# Patient Record
Sex: MALE | Race: WHITE | NOT HISPANIC OR LATINO | Employment: FULL TIME | URBAN - METROPOLITAN AREA
[De-identification: names, ages, dates, MRNs, and addresses within clinical notes are randomized per-mention and may not be internally consistent; named-entity substitution may affect disease eponyms.]

---

## 2023-02-28 LAB
ERYTHROCYTE DISTRIBUTION WIDTH (RATIO) BY AUTOMATED COUNT: 13.8 % (ref 11.5–14.5)
ERYTHROCYTE MEAN CORPUSCULAR HEMOGLOBIN CONCENTRATION (G/DL) BY AUTOMATED: 30.6 G/DL (ref 32–36)
ERYTHROCYTE MEAN CORPUSCULAR VOLUME (FL) BY AUTOMATED COUNT: 79 FL (ref 80–100)
ERYTHROCYTES (10*6/UL) IN BLOOD BY AUTOMATED COUNT: 5.41 X10E12/L (ref 4.5–5.9)
HEMATOCRIT (%) IN BLOOD BY AUTOMATED COUNT: 42.8 % (ref 41–52)
HEMOGLOBIN (G/DL) IN BLOOD: 13.1 G/DL (ref 13.5–17.5)
LEUKOCYTES (10*3/UL) IN BLOOD BY AUTOMATED COUNT: 8.6 X10E9/L (ref 4.4–11.3)
NRBC (PER 100 WBCS) BY AUTOMATED COUNT: 0 /100 WBC (ref 0–0)
PLATELETS (10*3/UL) IN BLOOD AUTOMATED COUNT: 371 X10E9/L (ref 150–450)
TESTOSTERONE (NG/DL) IN SER/PLAS: 396 NG/DL (ref 240–1000)

## 2023-03-06 DIAGNOSIS — F64.9 GENDER DYSPHORIA: Primary | ICD-10-CM

## 2023-03-06 RX ORDER — TESTOSTERONE CYPIONATE 200 MG/ML
120 INJECTION, SOLUTION INTRAMUSCULAR
Qty: 10 ML | Refills: 0 | Status: SHIPPED | OUTPATIENT
Start: 2023-03-06 | End: 2023-05-19 | Stop reason: SDUPTHER

## 2023-03-06 RX ORDER — TESTOSTERONE CYPIONATE 200 MG/ML
120 INJECTION, SOLUTION INTRAMUSCULAR
COMMUNITY
End: 2023-03-06 | Stop reason: SDUPTHER

## 2023-05-18 PROBLEM — M20.5X2: Status: ACTIVE | Noted: 2023-05-18

## 2023-05-18 PROBLEM — F33.8 SEASONAL AFFECTIVE DISORDER (CMS-HCC): Status: ACTIVE | Noted: 2023-05-18

## 2023-05-18 PROBLEM — E66.9 OBESITY (BMI 30-39.9): Status: ACTIVE | Noted: 2023-05-18

## 2023-05-18 PROBLEM — L98.7 EXCESSIVE SKIN AND SUBCUTANEOUS TISSUE: Status: ACTIVE | Noted: 2023-05-18

## 2023-05-18 PROBLEM — S99.242D: Status: RESOLVED | Noted: 2023-05-18 | Resolved: 2023-05-18

## 2023-05-18 PROBLEM — R14.0 ABDOMINAL BLOATING: Status: RESOLVED | Noted: 2023-05-18 | Resolved: 2023-05-18

## 2023-05-18 PROBLEM — S06.0XAA CONCUSSION: Status: RESOLVED | Noted: 2023-05-18 | Resolved: 2023-05-18

## 2023-05-18 PROBLEM — F41.9 ANXIETY: Status: ACTIVE | Noted: 2023-05-18

## 2023-05-18 PROBLEM — Z78.9 FEMALE-TO-MALE TRANSGENDER PERSON: Status: ACTIVE | Noted: 2023-05-18

## 2023-05-18 PROBLEM — L70.9 ACNE: Status: ACTIVE | Noted: 2023-05-18

## 2023-05-18 PROBLEM — F64.9 GENDER DYSPHORIA: Status: ACTIVE | Noted: 2023-05-18

## 2023-05-18 PROBLEM — F90.0 ATTENTION DEFICIT HYPERACTIVITY DISORDER (ADHD), PREDOMINANTLY INATTENTIVE TYPE: Status: ACTIVE | Noted: 2023-05-18

## 2023-05-18 PROBLEM — F32.5 MAJOR DEPRESSIVE DISORDER IN REMISSION (CMS-HCC): Status: ACTIVE | Noted: 2023-05-18

## 2023-05-18 PROBLEM — L40.9 PSORIASIS: Status: ACTIVE | Noted: 2023-05-18

## 2023-05-18 PROBLEM — K58.0 IRRITABLE BOWEL SYNDROME WITH DIARRHEA: Status: ACTIVE | Noted: 2023-05-18

## 2023-05-18 PROBLEM — D64.9 ANEMIA: Status: ACTIVE | Noted: 2023-05-18

## 2023-05-18 RX ORDER — CALCIUM CARBONATE 300MG(750)
400 TABLET,CHEWABLE ORAL DAILY
COMMUNITY
Start: 2023-04-28

## 2023-05-18 RX ORDER — LORATADINE PSEUDOEPHEDRINE SULFATE 10; 240 MG/1; MG/1
1 TABLET, EXTENDED RELEASE ORAL DAILY
COMMUNITY
Start: 2022-05-13 | End: 2023-12-21 | Stop reason: SDUPTHER

## 2023-05-18 RX ORDER — SYRINGE W-NEEDLE,DISPOSAB,3 ML 25GX5/8"
1 SYRINGE, EMPTY DISPOSABLE MISCELLANEOUS
COMMUNITY
Start: 2020-07-31

## 2023-05-18 RX ORDER — ESCITALOPRAM OXALATE 20 MG/1
1 TABLET ORAL DAILY
COMMUNITY
Start: 2022-05-18 | End: 2023-05-19 | Stop reason: ALTCHOICE

## 2023-05-18 RX ORDER — IBUPROFEN 600 MG/1
1 TABLET ORAL EVERY 4 HOURS PRN
COMMUNITY
End: 2023-10-27 | Stop reason: ALTCHOICE

## 2023-05-18 RX ORDER — LISDEXAMFETAMINE DIMESYLATE 50 MG/1
1 CAPSULE ORAL EVERY MORNING
COMMUNITY
Start: 2022-11-10

## 2023-05-18 RX ORDER — TACROLIMUS 1 MG/G
1 OINTMENT TOPICAL 2 TIMES DAILY
COMMUNITY
Start: 2022-09-16 | End: 2023-10-27 | Stop reason: ALTCHOICE

## 2023-05-18 RX ORDER — CLINDAMYCIN PHOSPHATE 10 MG/G
1 GEL TOPICAL 2 TIMES DAILY
COMMUNITY
Start: 2022-09-16 | End: 2024-04-16 | Stop reason: WASHOUT

## 2023-05-19 ENCOUNTER — OFFICE VISIT (OUTPATIENT)
Dept: PRIMARY CARE | Facility: CLINIC | Age: 28
End: 2023-05-19
Payer: COMMERCIAL

## 2023-05-19 VITALS
RESPIRATION RATE: 18 BRPM | TEMPERATURE: 95.1 F | OXYGEN SATURATION: 98 % | SYSTOLIC BLOOD PRESSURE: 122 MMHG | HEIGHT: 65 IN | HEART RATE: 90 BPM | BODY MASS INDEX: 31.75 KG/M2 | WEIGHT: 190.6 LBS | DIASTOLIC BLOOD PRESSURE: 84 MMHG

## 2023-05-19 DIAGNOSIS — Z11.3 SCREENING EXAMINATION FOR STD (SEXUALLY TRANSMITTED DISEASE): ICD-10-CM

## 2023-05-19 DIAGNOSIS — E66.9 OBESITY (BMI 30-39.9): ICD-10-CM

## 2023-05-19 DIAGNOSIS — F64.9 GENDER DYSPHORIA: Primary | ICD-10-CM

## 2023-05-19 PROCEDURE — 3008F BODY MASS INDEX DOCD: CPT | Performed by: FAMILY MEDICINE

## 2023-05-19 PROCEDURE — 99214 OFFICE O/P EST MOD 30 MIN: CPT | Performed by: FAMILY MEDICINE

## 2023-05-19 PROCEDURE — 1036F TOBACCO NON-USER: CPT | Performed by: FAMILY MEDICINE

## 2023-05-19 RX ORDER — TESTOSTERONE CYPIONATE 200 MG/ML
INJECTION, SOLUTION INTRAMUSCULAR
Qty: 10 ML | Refills: 2 | Status: SHIPPED | OUTPATIENT
Start: 2023-05-19 | End: 2023-05-25 | Stop reason: SDUPTHER

## 2023-05-19 ASSESSMENT — ENCOUNTER SYMPTOMS
JOINT SWELLING: 0
HALLUCINATIONS: 0
FATIGUE: 0
FEVER: 0
CONFUSION: 0
DYSURIA: 0
COUGH: 0
BACK PAIN: 0
DIARRHEA: 0
MYALGIAS: 0
POLYDIPSIA: 0
NUMBNESS: 0
VOMITING: 0
PALPITATIONS: 0
CHILLS: 0
HEADACHES: 0
SLEEP DISTURBANCE: 0
WEAKNESS: 0
HEMATURIA: 0
SHORTNESS OF BREATH: 0
SORE THROAT: 0
WHEEZING: 0
APPETITE CHANGE: 0
NAUSEA: 0
AGITATION: 0
EYE REDNESS: 0
FREQUENCY: 0
EYE PAIN: 0
ACTIVITY CHANGE: 0
CONSTIPATION: 0
WOUND: 0
ABDOMINAL PAIN: 0
DIFFICULTY URINATING: 0
DIZZINESS: 0

## 2023-05-19 ASSESSMENT — PAIN SCALES - GENERAL: PAINLEVEL: 0-NO PAIN

## 2023-05-19 NOTE — ASSESSMENT & PLAN NOTE
Energy improved on 0.7mg every 14 days, patient content, no significant side effects. Continues to follow with Yuval Chris for psychiatric needs. Rescheduled appointment with Dr. Vazquez, but still excited for this appointment. Work going well, stress improving.

## 2023-05-19 NOTE — ASSESSMENT & PLAN NOTE
10 pound intentional weight loss since 2/2023, attributes to being outside more with more physical activity, aims to get to around 170 pounds, encouraged continued efforts.

## 2023-05-19 NOTE — PROGRESS NOTES
Subjective   Patient ID: Sheng is a 27 y.o. FtM transgender man with PMH of anemia, psoriasis, obesity (BMI 31), anxiety, ADHD, depression who presents for Follow-up.    # Gender dysphoria  - injecting every other week, not noticing big swings, wife hasn't noticed anything either;  - usually picks up 6 vials for 12 weeks (last filled March 6th)  - upped dose in 11/2022, which helped to decrease downswing in symptoms, has felt that levels are stable  - energy ok, now on testosterone 0.7mg every other week  - no issues with needle phobia, injects himself  - next appointment with Yuval Chris is 7/24 (last saw him 4/28)  - rescheduled appointment with Dr. Vazquez  - lost 10 pounds since 2/2023; is summer time, outside more often, works as an  with fair amount of site visits  - no smoking, 1-2 EtOH drink per week, THC edibles (gets through [place])  - stress not too bad now, last month had a couple 50 hour weeks, work hours have been improving over the past two weeks      Review of Systems   Constitutional:  Negative for activity change, appetite change, chills, fatigue and fever.   HENT:  Negative for congestion, hearing loss and sore throat.    Eyes:  Negative for pain, redness and visual disturbance.   Respiratory:  Negative for cough, shortness of breath and wheezing.    Cardiovascular:  Negative for chest pain, palpitations and leg swelling.   Gastrointestinal:  Negative for abdominal pain, constipation, diarrhea, nausea and vomiting.   Endocrine: Negative for cold intolerance, heat intolerance, polydipsia and polyuria.   Genitourinary:  Negative for difficulty urinating, dysuria, frequency, hematuria and urgency.   Musculoskeletal:  Negative for back pain, gait problem, joint swelling and myalgias.   Skin:  Negative for rash and wound.   Allergic/Immunologic: Negative for immunocompromised state.   Neurological:  Negative for dizziness, syncope, weakness, numbness and headaches.   Psychiatric/Behavioral:   "Negative for agitation, behavioral problems, confusion, hallucinations and sleep disturbance.      Current Outpatient Medications   Medication Instructions    clindamycin (Clindagel) 1 % gel 1 Application, Topical, 2 times daily    diclofenac sodium 1 % kit 1 Application, Topical, 3 times daily PRN    ibuprofen 600 mg tablet 1 tablet, oral, Every 4 hours PRN    lisdexamfetamine (Vyvanse) 50 mg capsule 1 capsule, oral, Every morning    loratadine-pseudoephedrine (Claritin-D 24 Hour)  mg 24 hr tablet 1 tablet, oral, Daily    magnesium oxide (MAG-OX) 400 mg, oral, Daily    syringe with needle (Syringe 3cc/22Gx1\") 3 mL 22 gauge x 1\" syringe 1 each, intramuscular, Every 14 days    syringe with needle 12 mL 18 gauge x 1\" syringe 1 each, Does not apply, Every 14 days, Use 18G needle to draw up Testosterone, 22G needle to inject.    tacrolimus (Protopic) 0.1 % ointment 1 Application, Topical, 2 times daily    testosterone cypionate (Depo-Testosterone) 200 mg/mL injection Inject 0.7ml every 2 weeks       Objective   Vitals: /84 (BP Location: Left arm, Patient Position: Sitting, BP Cuff Size: Adult)   Pulse 90   Temp 35.1 °C (95.1 °F) (Temporal)   Resp 18   Ht 1.651 m (5' 5\")   Wt 86.5 kg (190 lb 9.6 oz)   SpO2 98%   BMI 31.72 kg/m²      Physical Exam  Vitals reviewed.   Constitutional:       General: He is not in acute distress.     Appearance: Normal appearance. He is not ill-appearing.   HENT:      Head: Normocephalic and atraumatic.   Eyes:      Extraocular Movements: Extraocular movements intact.      Conjunctiva/sclera: Conjunctivae normal.   Cardiovascular:      Rate and Rhythm: Normal rate and regular rhythm.      Pulses: Normal pulses.      Heart sounds: No murmur heard.     No friction rub. No gallop.   Pulmonary:      Effort: Pulmonary effort is normal. No respiratory distress.      Breath sounds: Normal breath sounds. No wheezing, rhonchi or rales.   Abdominal:      General: Abdomen is flat. " There is no distension.      Palpations: Abdomen is soft.      Tenderness: There is no abdominal tenderness. There is no guarding.   Musculoskeletal:         General: No tenderness or signs of injury. Normal range of motion.      Cervical back: Normal range of motion.      Right lower leg: No edema.      Left lower leg: No edema.   Skin:     General: Skin is warm and dry.   Neurological:      General: No focal deficit present.      Mental Status: He is alert and oriented to person, place, and time.      Cranial Nerves: No cranial nerve deficit.      Motor: No weakness.      Gait: Gait normal.   Psychiatric:         Mood and Affect: Mood normal.         Behavior: Behavior normal.       Assessment/Plan   Problem List Items Addressed This Visit       Gender dysphoria - Primary     Energy improved on 0.7mg every 14 days, patient content, no significant side effects. Continues to follow with Yuval Chris for psychiatric needs. Rescheduled appointment with Dr. Vazquez, but still excited for this appointment. Work going well, stress improving.         Relevant Medications    testosterone cypionate (Depo-Testosterone) 200 mg/mL injection    Other Relevant Orders    CBC    Testosterone    Obesity (BMI 30-39.9)     10 pound intentional weight loss since 2/2023, attributes to being outside more with more physical activity, aims to get to around 170 pounds, encouraged continued efforts.          Other Visit Diagnoses       Screening examination for STD (sexually transmitted disease)        routine STI screening ordered today, patient amenable    Relevant Orders    HIV 1/2 Antigen/Antibody Screen with Reflex to Confirmation    C. trachomatis / N. gonorrhoeae, DNA probe    Syphilis Screen with Reflex          Attending Supervision: Patient seen and discussed with attending physician (cosigner listed on this note).    RTC in 3-6mo, or earlier as needed.    Lazara Barragan MD  Family Medicine PGY2  [facility]

## 2023-05-24 ENCOUNTER — LAB (OUTPATIENT)
Dept: LAB | Facility: LAB | Age: 28
End: 2023-05-24
Payer: COMMERCIAL

## 2023-05-24 DIAGNOSIS — F64.9 GENDER DYSPHORIA: ICD-10-CM

## 2023-05-24 DIAGNOSIS — Z11.3 SCREENING EXAMINATION FOR STD (SEXUALLY TRANSMITTED DISEASE): ICD-10-CM

## 2023-05-24 LAB
ERYTHROCYTE DISTRIBUTION WIDTH (RATIO) BY AUTOMATED COUNT: 13.7 % (ref 11.5–14.5)
ERYTHROCYTE MEAN CORPUSCULAR HEMOGLOBIN CONCENTRATION (G/DL) BY AUTOMATED: 30 G/DL (ref 32–36)
ERYTHROCYTE MEAN CORPUSCULAR VOLUME (FL) BY AUTOMATED COUNT: 80 FL (ref 80–100)
ERYTHROCYTES (10*6/UL) IN BLOOD BY AUTOMATED COUNT: 5.56 X10E12/L (ref 4.5–5.9)
HEMATOCRIT (%) IN BLOOD BY AUTOMATED COUNT: 44.6 % (ref 41–52)
HEMOGLOBIN (G/DL) IN BLOOD: 13.4 G/DL (ref 13.5–17.5)
LEUKOCYTES (10*3/UL) IN BLOOD BY AUTOMATED COUNT: 9.2 X10E9/L (ref 4.4–11.3)
NRBC (PER 100 WBCS) BY AUTOMATED COUNT: 0 /100 WBC (ref 0–0)
PLATELETS (10*3/UL) IN BLOOD AUTOMATED COUNT: 375 X10E9/L (ref 150–450)

## 2023-05-24 PROCEDURE — 85027 COMPLETE CBC AUTOMATED: CPT

## 2023-05-24 PROCEDURE — 84403 ASSAY OF TOTAL TESTOSTERONE: CPT

## 2023-05-24 PROCEDURE — 87389 HIV-1 AG W/HIV-1&-2 AB AG IA: CPT

## 2023-05-24 PROCEDURE — 87491 CHLMYD TRACH DNA AMP PROBE: CPT

## 2023-05-24 PROCEDURE — 86780 TREPONEMA PALLIDUM: CPT

## 2023-05-24 PROCEDURE — 87591 N.GONORRHOEAE DNA AMP PROB: CPT

## 2023-05-24 PROCEDURE — 36415 COLL VENOUS BLD VENIPUNCTURE: CPT

## 2023-05-25 DIAGNOSIS — F64.9 GENDER DYSPHORIA: Primary | ICD-10-CM

## 2023-05-25 LAB
CHLAMYDIA TRACH., AMPLIFIED: NEGATIVE
HIV 1/ 2 AG/AB SCREEN: NONREACTIVE
N. GONORRHEA, AMPLIFIED: NEGATIVE
SYPHILIS TOTAL AB: NONREACTIVE
TESTOSTERONE (NG/DL) IN SER/PLAS: 67 NG/DL (ref 240–1000)

## 2023-05-25 NOTE — PROGRESS NOTES
I saw and evaluated the patient. I personally obtained the key and critical portions of the history and physical exam or was physically present for key and critical portions performed by the resident. I reviewed the resident's documentation and discussed the patient with the resident. I agree with the resident's medical decision making as documented in the note.    Melinda Graves MD

## 2023-05-31 ENCOUNTER — LAB (OUTPATIENT)
Dept: LAB | Facility: LAB | Age: 28
End: 2023-05-31
Payer: COMMERCIAL

## 2023-05-31 DIAGNOSIS — F64.9 GENDER DYSPHORIA: ICD-10-CM

## 2023-05-31 PROCEDURE — 36415 COLL VENOUS BLD VENIPUNCTURE: CPT

## 2023-05-31 PROCEDURE — 84403 ASSAY OF TOTAL TESTOSTERONE: CPT

## 2023-06-01 LAB — TESTOSTERONE (NG/DL) IN SER/PLAS: 440 NG/DL (ref 240–1000)

## 2023-06-08 DIAGNOSIS — F64.9 GENDER DYSPHORIA: ICD-10-CM

## 2023-06-08 RX ORDER — TESTOSTERONE CYPIONATE 200 MG/ML
INJECTION, SOLUTION INTRAMUSCULAR
Qty: 10 ML | Refills: 2 | Status: SHIPPED | OUTPATIENT
Start: 2023-06-08 | End: 2023-11-15 | Stop reason: SDUPTHER

## 2023-07-29 LAB
AMPHETAMINE (PRESENCE) IN URINE BY SCREEN METHOD: ABNORMAL
BARBITURATES PRESENCE IN URINE BY SCREEN METHOD: ABNORMAL
BENZODIAZEPINE (PRESENCE) IN URINE BY SCREEN METHOD: ABNORMAL
CANNABINOIDS IN URINE BY SCREEN METHOD: ABNORMAL
COCAINE (PRESENCE) IN URINE BY SCREEN METHOD: ABNORMAL
DRUG SCREEN COMMENT URINE: ABNORMAL
FENTANYL URINE: ABNORMAL
METHADONE (PRESENCE) IN URINE BY SCREEN METHOD: ABNORMAL
OPIATES (PRESENCE) IN URINE BY SCREEN METHOD: ABNORMAL
OXYCODONE (PRESENCE) IN URINE BY SCREEN METHOD: ABNORMAL
PHENCYCLIDINE (PRESENCE) IN URINE BY SCREEN METHOD: ABNORMAL

## 2023-08-07 LAB
AMPHETAMINES,URINE: 808 NG/ML
MDA,URINE: <200 NG/ML
MDEA,URINE: <200 NG/ML
MDMA,UR: <200 NG/ML
METHAMPHETAMINE QUANTITATIVE URINE: <200 NG/ML
PHENTERMINE,UR: <200 NG/ML

## 2023-08-17 LAB
ALANINE AMINOTRANSFERASE (SGPT) (U/L) IN SER/PLAS: 15 U/L (ref 10–52)
ALBUMIN (G/DL) IN SER/PLAS: 4.8 G/DL (ref 3.4–5)
ALKALINE PHOSPHATASE (U/L) IN SER/PLAS: 96 U/L (ref 33–120)
ANION GAP IN SER/PLAS: 14 MMOL/L (ref 10–20)
ASPARTATE AMINOTRANSFERASE (SGOT) (U/L) IN SER/PLAS: 12 U/L (ref 9–39)
BILIRUBIN TOTAL (MG/DL) IN SER/PLAS: 0.7 MG/DL (ref 0–1.2)
CALCIUM (MG/DL) IN SER/PLAS: 9.8 MG/DL (ref 8.6–10.6)
CARBON DIOXIDE, TOTAL (MMOL/L) IN SER/PLAS: 30 MMOL/L (ref 21–32)
CHLORIDE (MMOL/L) IN SER/PLAS: 102 MMOL/L (ref 98–107)
CREATININE (MG/DL) IN SER/PLAS: 1 MG/DL (ref 0.5–1.3)
ERYTHROCYTE DISTRIBUTION WIDTH (RATIO) BY AUTOMATED COUNT: 14.1 % (ref 11.5–14.5)
ERYTHROCYTE MEAN CORPUSCULAR HEMOGLOBIN CONCENTRATION (G/DL) BY AUTOMATED: 30.7 G/DL (ref 32–36)
ERYTHROCYTE MEAN CORPUSCULAR VOLUME (FL) BY AUTOMATED COUNT: 81 FL (ref 80–100)
ERYTHROCYTES (10*6/UL) IN BLOOD BY AUTOMATED COUNT: 5.57 X10E12/L (ref 4.5–5.9)
GFR MALE: >90 ML/MIN/1.73M2
GLUCOSE (MG/DL) IN SER/PLAS: 82 MG/DL (ref 74–99)
HEMATOCRIT (%) IN BLOOD BY AUTOMATED COUNT: 45 % (ref 41–52)
HEMOGLOBIN (G/DL) IN BLOOD: 13.8 G/DL (ref 13.5–17.5)
LEUKOCYTES (10*3/UL) IN BLOOD BY AUTOMATED COUNT: 9.1 X10E9/L (ref 4.4–11.3)
NRBC (PER 100 WBCS) BY AUTOMATED COUNT: 0 /100 WBC (ref 0–0)
PLATELETS (10*3/UL) IN BLOOD AUTOMATED COUNT: 336 X10E9/L (ref 150–450)
POTASSIUM (MMOL/L) IN SER/PLAS: 4.5 MMOL/L (ref 3.5–5.3)
PROTEIN TOTAL: 7.7 G/DL (ref 6.4–8.2)
SODIUM (MMOL/L) IN SER/PLAS: 141 MMOL/L (ref 136–145)
TESTOSTERONE (NG/DL) IN SER/PLAS: 415 NG/DL (ref 240–1000)
UREA NITROGEN (MG/DL) IN SER/PLAS: 17 MG/DL (ref 6–23)

## 2023-09-30 PROBLEM — R41.840 INATTENTION: Status: ACTIVE | Noted: 2023-09-30

## 2023-09-30 PROBLEM — D22.4 MELANOCYTIC NEVI OF SCALP AND NECK: Status: ACTIVE | Noted: 2021-04-06

## 2023-09-30 PROBLEM — L21.9 SEBORRHEIC DERMATITIS, UNSPECIFIED: Status: ACTIVE | Noted: 2021-04-06

## 2023-09-30 PROBLEM — F07.81 CONCUSSION SYNDROME: Status: ACTIVE | Noted: 2023-09-30

## 2023-09-30 PROBLEM — N92.0 HEAVY MENSES: Status: ACTIVE | Noted: 2023-09-30

## 2023-09-30 RX ORDER — ISOPROPYL ALCOHOL 70 ML/100ML
SWAB TOPICAL
COMMUNITY
Start: 2020-07-31

## 2023-09-30 RX ORDER — KETOCONAZOLE 20 MG/G
1 CREAM TOPICAL
COMMUNITY
Start: 2021-04-06 | End: 2023-10-27 | Stop reason: ALTCHOICE

## 2023-10-04 ENCOUNTER — TELEMEDICINE (OUTPATIENT)
Dept: PRIMARY CARE | Facility: CLINIC | Age: 28
End: 2023-10-04
Payer: COMMERCIAL

## 2023-10-04 DIAGNOSIS — F64.9 GENDER DYSPHORIA: Primary | ICD-10-CM

## 2023-10-04 DIAGNOSIS — Z20.2 POSSIBLE EXPOSURE TO STD: ICD-10-CM

## 2023-10-04 PROCEDURE — 99213 OFFICE O/P EST LOW 20 MIN: CPT | Mod: GT,95 | Performed by: FAMILY MEDICINE

## 2023-10-04 PROCEDURE — 99213 OFFICE O/P EST LOW 20 MIN: CPT | Performed by: FAMILY MEDICINE

## 2023-10-05 NOTE — PROGRESS NOTES
Subjective   Patient ID: Sheng Turner is a 27 y.o. who presents for follow-up  HPI    Gender care  - Tolerating testosterone every 2 weeks. Previously had highs/lows but now feels good with current dosing. Taking 0.8ml (160mg) every 2 weeks  - s/p mastectomy, hysterectomy, oophorectomy     Depression, ADHD  - Mood stable, following with psychiatry    No further concerns today      Review of Systems  10 point review of systems negative except as noted in HPI.    Objective     There were no vitals taken for this visit.  Gen: Well appearing, no acute distress  HEENT: Mucous membranes moist  Pulm: Respirations nonlabored  Psych: Normal mood and affect      Assessment/Plan   28 yo seen via virtual visit for follow-up    Gender dysphoria  - Continue current regimen  - Repeat safety labs as ordered    Health maintenance  - Requesting STD testing. 2 partners in last 3 months    ADHD, depression  - Continue current management    RTC 6 months or sooner as needed

## 2023-10-14 ENCOUNTER — LAB (OUTPATIENT)
Dept: LAB | Facility: LAB | Age: 28
End: 2023-10-14
Payer: COMMERCIAL

## 2023-10-14 DIAGNOSIS — Z20.2 POSSIBLE EXPOSURE TO STD: ICD-10-CM

## 2023-10-14 DIAGNOSIS — F64.9 GENDER DYSPHORIA: ICD-10-CM

## 2023-10-14 LAB
ERYTHROCYTE [DISTWIDTH] IN BLOOD BY AUTOMATED COUNT: 13.4 % (ref 11.5–14.5)
HCT VFR BLD AUTO: 44.8 % (ref 36–52)
HGB BLD-MCNC: 13.4 G/DL (ref 12–17.5)
HIV 1+2 AB+HIV1 P24 AG SERPL QL IA: NONREACTIVE
MCH RBC QN AUTO: 25.4 PG (ref 26–34)
MCHC RBC AUTO-ENTMCNC: 29.9 G/DL (ref 32–36)
MCV RBC AUTO: 85 FL (ref 80–100)
NRBC BLD-RTO: 0 /100 WBCS (ref 0–0)
PLATELET # BLD AUTO: 328 X10*3/UL (ref 150–450)
PMV BLD AUTO: 10.1 FL (ref 7.5–11.5)
RBC # BLD AUTO: 5.27 X10*6/UL (ref 4–5.9)
TESTOST SERPL-MCNC: 283 NG/DL (ref 0–1000)
WBC # BLD AUTO: 8.4 X10*3/UL (ref 4.4–11.3)

## 2023-10-14 PROCEDURE — 87389 HIV-1 AG W/HIV-1&-2 AB AG IA: CPT

## 2023-10-14 PROCEDURE — 87800 DETECT AGNT MULT DNA DIREC: CPT

## 2023-10-14 PROCEDURE — 36415 COLL VENOUS BLD VENIPUNCTURE: CPT

## 2023-10-14 PROCEDURE — 85027 COMPLETE CBC AUTOMATED: CPT

## 2023-10-14 PROCEDURE — 84403 ASSAY OF TOTAL TESTOSTERONE: CPT

## 2023-10-14 PROCEDURE — 86780 TREPONEMA PALLIDUM: CPT

## 2023-10-15 LAB
C TRACH RRNA SPEC QL NAA+PROBE: NEGATIVE
N GONORRHOEA DNA SPEC QL PROBE+SIG AMP: NEGATIVE
T PALLIDUM AB SER QL: NONREACTIVE

## 2023-10-27 ENCOUNTER — OFFICE VISIT (OUTPATIENT)
Dept: BEHAVIORAL HEALTH | Facility: CLINIC | Age: 28
End: 2023-10-27
Payer: COMMERCIAL

## 2023-10-27 VITALS
TEMPERATURE: 98.4 F | SYSTOLIC BLOOD PRESSURE: 126 MMHG | BODY MASS INDEX: 30.94 KG/M2 | HEIGHT: 65 IN | HEART RATE: 110 BPM | DIASTOLIC BLOOD PRESSURE: 86 MMHG | RESPIRATION RATE: 16 BRPM | WEIGHT: 185.7 LBS

## 2023-10-27 DIAGNOSIS — F33.8 SEASONAL AFFECTIVE DISORDER (CMS-HCC): Primary | ICD-10-CM

## 2023-10-27 DIAGNOSIS — F90.0 ATTENTION DEFICIT HYPERACTIVITY DISORDER (ADHD), PREDOMINANTLY INATTENTIVE TYPE: ICD-10-CM

## 2023-10-27 DIAGNOSIS — F41.9 ANXIETY: ICD-10-CM

## 2023-10-27 DIAGNOSIS — F64.9 GENDER DYSPHORIA: ICD-10-CM

## 2023-10-27 PROCEDURE — 99214 OFFICE O/P EST MOD 30 MIN: CPT | Performed by: NURSE PRACTITIONER

## 2023-10-27 PROCEDURE — 1036F TOBACCO NON-USER: CPT | Performed by: NURSE PRACTITIONER

## 2023-10-27 PROCEDURE — 3008F BODY MASS INDEX DOCD: CPT | Performed by: NURSE PRACTITIONER

## 2023-10-27 RX ORDER — ESCITALOPRAM OXALATE 10 MG/1
10 TABLET ORAL DAILY
Qty: 90 TABLET | Refills: 0 | Status: SHIPPED | OUTPATIENT
Start: 2023-10-27 | End: 2023-12-19 | Stop reason: SDUPTHER

## 2023-10-27 ASSESSMENT — PATIENT HEALTH QUESTIONNAIRE - PHQ9
3. TROUBLE FALLING OR STAYING ASLEEP OR SLEEPING TOO MUCH: SEVERAL DAYS
2. FEELING DOWN, DEPRESSED OR HOPELESS: NOT AT ALL
4. FEELING TIRED OR HAVING LITTLE ENERGY: SEVERAL DAYS
7. TROUBLE CONCENTRATING ON THINGS, SUCH AS READING THE NEWSPAPER OR WATCHING TELEVISION: NOT AT ALL
8. MOVING OR SPEAKING SO SLOWLY THAT OTHER PEOPLE COULD HAVE NOTICED. OR THE OPPOSITE, BEING SO FIGETY OR RESTLESS THAT YOU HAVE BEEN MOVING AROUND A LOT MORE THAN USUAL: NOT AT ALL
1. LITTLE INTEREST OR PLEASURE IN DOING THINGS: SEVERAL DAYS
5. POOR APPETITE OR OVEREATING: NOT AT ALL
10. IF YOU CHECKED OFF ANY PROBLEMS, HOW DIFFICULT HAVE THESE PROBLEMS MADE IT FOR YOU TO DO YOUR WORK, TAKE CARE OF THINGS AT HOME, OR GET ALONG WITH OTHER PEOPLE: NOT DIFFICULT AT ALL
9. THOUGHTS THAT YOU WOULD BE BETTER OFF DEAD, OR OF HURTING YOURSELF: NOT AT ALL
6. FEELING BAD ABOUT YOURSELF - OR THAT YOU ARE A FAILURE OR HAVE LET YOURSELF OR YOUR FAMILY DOWN: SEVERAL DAYS

## 2023-10-27 ASSESSMENT — ANXIETY QUESTIONNAIRES
1. FEELING NERVOUS, ANXIOUS, OR ON EDGE: SEVERAL DAYS
4. TROUBLE RELAXING: SEVERAL DAYS
2. NOT BEING ABLE TO STOP OR CONTROL WORRYING: SEVERAL DAYS
7. FEELING AFRAID AS IF SOMETHING AWFUL MIGHT HAPPEN: SEVERAL DAYS
5. BEING SO RESTLESS THAT IT IS HARD TO SIT STILL: NOT AT ALL
3. WORRYING TOO MUCH ABOUT DIFFERENT THINGS: NOT AT ALL
GAD7 TOTAL SCORE: 5
IF YOU CHECKED OFF ANY PROBLEMS ON THIS QUESTIONNAIRE, HOW DIFFICULT HAVE THESE PROBLEMS MADE IT FOR YOU TO DO YOUR WORK, TAKE CARE OF THINGS AT HOME, OR GET ALONG WITH OTHER PEOPLE: SOMEWHAT DIFFICULT
6. BECOMING EASILY ANNOYED OR IRRITABLE: SEVERAL DAYS

## 2023-10-27 NOTE — PROGRESS NOTES
"Adult Ambulatory Psychiatry Progress Note      Assessment/Plan     Impression:  Sheng Turner is a 27 y.o. transgender male domiciled , employed as an  who presents for follow up with CC of \"I am doing good. We went to [place] but it was too humid.\"    Plan: Reviewed and agreed to restarting Lexapro due to S.A.D. - 1 tablet for the first month, then during second month, first 2 weeks 2 tablets daily then remainder of script, go to 3 tablets daily. Continue taking Vyvanse otherwise and no other changes to treatment plan.  Medication: Restarts Lexapro 10mg daily then increases to 20mg in a month for 2 weeks, then 30mg daily for remainder of script. Continues taking Vyvanse 50mg every day    Reviewed r/b/a, possible side effects of the medication. Client is aware about the benefit outweighs the risk.     Diagnoses and all orders for this visit:  Seasonal affective disorder (CMS/HCC)  -     escitalopram (Lexapro) 10 mg tablet; Take 1 tablet (10 mg) by mouth once daily. First month - 1 tablet, second month, first 2 weeks - 2 tablets, then start taking 3 tablets.  Anxiety  -     escitalopram (Lexapro) 10 mg tablet; Take 1 tablet (10 mg) by mouth once daily. First month - 1 tablet, second month, first 2 weeks - 2 tablets, then start taking 3 tablets.  Attention deficit hyperactivity disorder (ADHD), predominantly inattentive type  Gender dysphoria      Therapy: none    Other: n/a    Patient is reminded that if there is SI to call [phone] and get themselves to the closest ED for evaluation, otherwise contact me for other questions/concerns.     Subjective   HPI:  \"Both the patient, and family and caregivers and guardians as appropriate, were informed of the current need to conduct treatment via telephone. 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 Reports feeling heightened anxiety as a result after having just returned from vacation and needs to settle down and be prepared for having his ideas on implementing the changes next week, but 'I am also taking my fundamentals exam next Wednesday and then there are 2 others later on.' Reports still feels like he has Imposter Syndrome going on after 4 years working at the company. Reports in early Jan 2024 will have the rest of his face/jaw reconstruction completed with dental caps. Reports brother graduated college and living in [place] and dealing with some depression (coming home every weekend, as he is worrying about their mother who recently had a brief lucid moment of clarity). Reports having talked with his dad about more tests for his mother and her dementia related to frontaltemporal and aphasia (father has mother on THC which is helping her have sporadic moments of clarity), but father has not done any life planning financially with their mother, so issues with his parents are problematic. This has been a struggle for both the patient and his brother emotionally and his brother is taking it harder. Reports his wife has been done - finished their work project and they are feeling less stressed, which leaves him 'a calmer me'. Reports working on communication issues with his wife as she is not offering responses to his queries and then when she asks him how his day is going, he gets puzzled as he is waiting for her to respoond to his queries for her, and is now waiting to see their therapist together next week. Reports putting the suture revisions on hold with Dr. Rogers until 2024 and as a result with have them, along with bra lift d/t patient having lost so much weight, and  can have the excess skin along with skin tags removed at the same time. Bottom surgery will be on hold until top surgery is completed. Reports great aunt passed right before destination wedding earlier this month to [place] and there was no family drama about his being trans. Reports sleep remains stable at 7 hours a night but is noticing because seasonal changes (temps were in the 50s recently), it was harder to get up in the AM, and feels it is a good time to resume Lexapro. Reports a dip in energy levels and more fatigued lasting until noon, 'before I would feel myself again.' Reports also noticing some depression and anxiety 'were creeping back with that shorter window now in the picture.' Reports appetite is 'still normal.' Reports no concerns with racing, ruminating, intrusive, or obsessive thoughts. Denies issues with ADHD and has found the generic version of Vyvanse to work better with his symptoms - as 'it kicks in sooner and lasts a hair longer than the brand for me.'     Onset/timeframe - 1 month  Type - anxiety  Duration - 1.5 weeks  Aggravating and/or relieving factors/triggers - family dynamics (mother's health is not the best, and father continues to make not the best decisions), but recently found out he was getting promoted and while excited, was taken by surprise and not prepared. Otherwise feels stable  Related symptoms  Treatment and treatment changes (new meds, dosage increases or decreases, med compliance, therapy frequency, etc.) (Past and Recent) - Lexapro 20mg QD (on hold), Vyvanse 50mg QD. Reports seeing Frances for therapy once every other Monday.     Issues: Denies SI/HI/AVH.             Review of Systems   Respiratory: Negative.     Cardiovascular: Negative.    Gastrointestinal: Negative.    Genitourinary: Negative.        OARRS:  10/27/2023  I have personally reviewed the OARRS report for Sheng Turner. I have considered the risks of abuse, dependence, addiction and  diversion    Is the patient prescribed a combination of a benzodiazepine and opioid?  No    Last Urine Drug Screen / ordered today: Yes  Recent Results (from the past 8760 hour(s))   Amphetamine Confirm, Urine    Collection Time: 07/29/23 11:33 AM   Result Value Ref Range    Amphetamines,Urine 808 ng/mL    MDA, Urine <200 ng/mL    MDEA, Urine <200 ng/mL    MDMA, Urine <200 ng/mL    Methamphetamine Quant, Ur <200 ng/mL    Phentermine,Urine <200 ng/mL   Drug Screen, Urine With Reflex to Confirmation    Collection Time: 07/29/23 11:33 AM   Result Value Ref Range    DRUG SCREEN COMMENT URINE SEE BELOW     Amphetamine Screen, Urine PRESUMPTIVE POSITIVE (A) NEGATIVE    Barbiturate Screen, Urine PRESUMPTIVE NEGATIVE NEGATIVE    BENZODIAZEPINE (PRESENCE) IN URINE BY SCREEN METHOD PRESUMPTIVE NEGATIVE NEGATIVE    Cannabinoid Screen, Urine PRESUMPTIVE NEGATIVE NEGATIVE    Cocaine Screen, Urine PRESUMPTIVE NEGATIVE NEGATIVE    Fentanyl, Ur PRESUMPTIVE NEGATIVE NEGATIVE    Methadone Screen, Urine PRESUMPTIVE NEGATIVE NEGATIVE    Opiate Screen, Urine PRESUMPTIVE NEGATIVE NEGATIVE    Oxycodone Screen, Ur PRESUMPTIVE NEGATIVE NEGATIVE    PCP Screen, Urine PRESUMPTIVE NEGATIVE NEGATIVE     Results are as expected.         Controlled Substance Agreement:  Date of the Last Agreement: 10/30/2023  Reviewed Controlled Substance Agreement including but not limited to the benefits, risks, and alternatives to treatment with a Controlled Substance medication(s).    Stimulants:   What is the patient's goal of therapy? Attention issues are stable  Is this being achieved with current treatment? yes    Activities of Daily Living:   Is your overall impression that this patient is benefiting (symptom reduction outweighs side effects) from stimulant therapy? Yes     1. Physical Functioning: Better  2. Family Relationship: Same  3. Social Relationship: Better  4. Mood: Better  5. Sleep Patterns: Better  6. Overall Function: Better      Objective  "  Mental Status Exam:  General Appearance: Well groomed, appropriate eye contact  Attitude/Behavior: Cooperative  Motor: Fidgeting  Speech: Emphatic speech, normal rate and prosody, Other: (comment) (at times slightly soft spoken)  Gait/Station: WFL - Within functional limits  Mood: 'good'  Affect: Euthymic, full-range, Congruent with mood and topic of conversation  Thought Process: Linear, goal directed  Thought Associations: No loosening of associations  Thought Content: Normal  Perception: No perceptual abnormalities noted  Sensorium: Alert and oriented to person, place, time and situation  Insight: Intact  Judgement: Intact  Cognition: Cognitively intact to conversational testing with respect to attention, orientation, fund of knowledge, recent and remote memory, and language  Testing: N/A    Vitals:  Vitals:    10/27/23 1525   BP: 126/86   Pulse: 110   Resp: 16   Temp: 36.9 °C (98.4 °F)     АЛЕКСАНДР-7/PHQ-9 scores reviewed: 5, 4 compared to 2, 2 reflecting mild bumps in both anxiety and depression.    Current Medications:  Current Outpatient Medications on File Prior to Visit   Medication Sig Dispense Refill    lisdexamfetamine (Vyvanse) 50 mg capsule Take 1 capsule (50 mg) by mouth once daily in the morning.      alcohol swabs pads, medicated USE AS DIRECTED.      clindamycin (Clindagel) 1 % gel Apply 1 Application topically 2 times a day.      diclofenac sodium 1 % kit Apply 1 Application topically 3 times a day as needed (pain).      loratadine-pseudoephedrine (Claritin-D 24 Hour)  mg 24 hr tablet Take 1 tablet by mouth once daily.      magnesium oxide (Mag-Ox) 400 mg tablet Take 1 tablet (400 mg) by mouth once daily.      syringe with needle (Syringe 3cc/22Gx1\") 3 mL 22 gauge x 1\" syringe Inject 1 each into the shoulder, thigh, or buttocks every 14 (fourteen) days.      syringe with needle 12 mL 18 gauge x 1\" syringe 1 each by Does not apply route every 14 (fourteen) days. Use 18G needle to draw up " Testosterone, 22G needle to inject.      testosterone cypionate (Depo-Testosterone) 200 mg/mL injection Inject 0.8ml every 2 weeks 10 mL 2    [DISCONTINUED] ibuprofen 600 mg tablet Take 1 tablet (600 mg) by mouth every 4 hours if needed for moderate pain (4 - 6).      [DISCONTINUED] ketoconazole (NIZOral) 2 % cream 1 Application.      [DISCONTINUED] tacrolimus (Protopic) 0.1 % ointment Apply 1 Application topically 2 times a day.       No current facility-administered medications on file prior to visit.       Lab Review:   Lab on 10/14/2023   Component Date Value    Testosterone 10/14/2023 283     Neisseria gonorrhea,Ampl* 10/14/2023 Negative     Chlamydia trachomatis, A* 10/14/2023 Negative     HIV 1/2 Antigen/Antibody* 10/14/2023 Nonreactive     WBC 10/14/2023 8.4     nRBC 10/14/2023 0.0     RBC 10/14/2023 5.27     Hemoglobin 10/14/2023 13.4     Hematocrit 10/14/2023 44.8     MCV 10/14/2023 85     MCH 10/14/2023 25.4 (L)     MCHC 10/14/2023 29.9 (L)     RDW 10/14/2023 13.4     Platelets 10/14/2023 328     MPV 10/14/2023 10.1     Syphilis Total Ab 10/14/2023 Nonreactive    Orders Only on 08/17/2023   Component Date Value    Glucose 08/17/2023 82     Sodium 08/17/2023 141     Potassium 08/17/2023 4.5     Chloride 08/17/2023 102     Bicarbonate 08/17/2023 30     Anion Gap 08/17/2023 14     Urea Nitrogen 08/17/2023 17     Creatinine 08/17/2023 1.00     GFR MALE 08/17/2023 >90     Calcium 08/17/2023 9.8     Albumin 08/17/2023 4.8     Alkaline Phosphatase 08/17/2023 96     Total Protein 08/17/2023 7.7     AST 08/17/2023 12     Total Bilirubin 08/17/2023 0.7     ALT (SGPT) 08/17/2023 15     Testosterone 08/17/2023 415     WBC 08/17/2023 9.1     nRBC 08/17/2023 0.0     RBC 08/17/2023 5.57     Hemoglobin 08/17/2023 13.8     Hematocrit 08/17/2023 45.0     MCV 08/17/2023 81     MCHC 08/17/2023 30.7 (L)     Platelets 08/17/2023 336     RDW 08/17/2023 14.1    Orders Only on 07/29/2023   Component Date Value    DRUG SCREEN  COMMENT URINE 07/29/2023 SEE BELOW     Amphetamine Screen, Urine 07/29/2023 PRESUMPTIVE POSITIVE (A)     Barbiturate Screen, Urine 07/29/2023 PRESUMPTIVE NEGATIVE     BENZODIAZEPINE (PRESENCE* 07/29/2023 PRESUMPTIVE NEGATIVE     Cannabinoid Screen, Urine 07/29/2023 PRESUMPTIVE NEGATIVE     Cocaine Screen, Urine 07/29/2023 PRESUMPTIVE NEGATIVE     Fentanyl, Ur 07/29/2023 PRESUMPTIVE NEGATIVE     Methadone Screen, Urine 07/29/2023 PRESUMPTIVE NEGATIVE     Opiate Screen, Urine 07/29/2023 PRESUMPTIVE NEGATIVE     Oxycodone Screen, Ur 07/29/2023 PRESUMPTIVE NEGATIVE     PCP Screen, Urine 07/29/2023 PRESUMPTIVE NEGATIVE     Amphetamines,Urine 07/29/2023 808     MDA, Urine 07/29/2023 <200     MDEA, Urine 07/29/2023 <200     MDMA, Urine 07/29/2023 <200     Methamphetamine Quant, Ur 07/29/2023 <200     Phentermine,Urine 07/29/2023 <200    Lab on 05/31/2023   Component Date Value    Testosterone 05/31/2023 440    Lab on 05/24/2023   Component Date Value    WBC 05/24/2023 9.2     nRBC 05/24/2023 0.0     RBC 05/24/2023 5.56     Hemoglobin 05/24/2023 13.4 (L)     Hematocrit 05/24/2023 44.6     MCV 05/24/2023 80     MCHC 05/24/2023 30.0 (L)     Platelets 05/24/2023 375     RDW 05/24/2023 13.7     Testosterone 05/24/2023 67 (L)     HIV 1 and 2 Screen 05/24/2023 NONREACTIVE     Neisseria gonorrhea,Ampl* 05/24/2023 NEGATIVE     Chlamydia trachomatis, A* 05/24/2023 NEGATIVE     Syphilis Total Ab 05/24/2023 NONREACTIVE          Time Spent:    Prep time: 1 min  Direct patient time: 32 min  Documentation time: 6 min  Total time: 38 min    Next Appointment:  Follow up in about 3 months (around 1/27/2024).

## 2023-10-30 ASSESSMENT — ENCOUNTER SYMPTOMS
GASTROINTESTINAL NEGATIVE: 1
RESPIRATORY NEGATIVE: 1
CARDIOVASCULAR NEGATIVE: 1

## 2023-11-15 DIAGNOSIS — F64.9 GENDER DYSPHORIA: ICD-10-CM

## 2023-11-16 RX ORDER — TESTOSTERONE CYPIONATE 200 MG/ML
INJECTION, SOLUTION INTRAMUSCULAR
Qty: 10 ML | Refills: 2 | Status: SHIPPED | OUTPATIENT
Start: 2023-11-16 | End: 2023-11-17 | Stop reason: SDUPTHER

## 2023-11-17 DIAGNOSIS — F64.9 GENDER DYSPHORIA: ICD-10-CM

## 2023-11-17 RX ORDER — TESTOSTERONE CYPIONATE 200 MG/ML
INJECTION, SOLUTION INTRAMUSCULAR
Qty: 10 ML | Refills: 2 | Status: SHIPPED | OUTPATIENT
Start: 2023-11-17 | End: 2024-04-15 | Stop reason: SDUPTHER

## 2023-12-19 DIAGNOSIS — F33.8 SEASONAL AFFECTIVE DISORDER (CMS-HCC): ICD-10-CM

## 2023-12-19 DIAGNOSIS — F41.9 ANXIETY: ICD-10-CM

## 2023-12-19 RX ORDER — ESCITALOPRAM OXALATE 10 MG/1
30 TABLET ORAL DAILY
Qty: 360 TABLET | Refills: 0 | Status: SHIPPED | OUTPATIENT
Start: 2023-12-19 | End: 2024-03-22 | Stop reason: SDUPTHER

## 2023-12-19 NOTE — PROGRESS NOTES
Patient requesting refill of Lexapro 30mg for S.A.D. and will be running out and only will have enough for 3 days worth while out of town after this Friday.

## 2023-12-21 DIAGNOSIS — J30.9 ALLERGIC RHINITIS, UNSPECIFIED SEASONALITY, UNSPECIFIED TRIGGER: Primary | ICD-10-CM

## 2023-12-21 RX ORDER — LORATADINE PSEUDOEPHEDRINE SULFATE 10; 240 MG/1; MG/1
1 TABLET, EXTENDED RELEASE ORAL DAILY
Qty: 30 TABLET | Refills: 2 | Status: SHIPPED | OUTPATIENT
Start: 2023-12-21 | End: 2024-05-01 | Stop reason: SDUPTHER

## 2024-01-02 DIAGNOSIS — F90.0 ATTENTION DEFICIT HYPERACTIVITY DISORDER (ADHD), PREDOMINANTLY INATTENTIVE TYPE: Primary | ICD-10-CM

## 2024-01-02 RX ORDER — LISDEXAMFETAMINE DIMESYLATE 50 MG/1
50 CAPSULE ORAL EVERY MORNING
Qty: 30 CAPSULE | Refills: 0 | Status: SHIPPED | OUTPATIENT
Start: 2024-01-02 | End: 2024-03-22 | Stop reason: WASHOUT

## 2024-01-02 RX ORDER — LISDEXAMFETAMINE DIMESYLATE 50 MG/1
50 CAPSULE ORAL EVERY MORNING
Qty: 30 CAPSULE | Refills: 0 | Status: SHIPPED | OUTPATIENT
Start: 2024-02-01 | End: 2024-03-22 | Stop reason: WASHOUT

## 2024-01-02 RX ORDER — LISDEXAMFETAMINE DIMESYLATE 50 MG/1
50 CAPSULE ORAL EVERY MORNING
Qty: 30 CAPSULE | Refills: 0 | Status: SHIPPED | OUTPATIENT
Start: 2024-03-02 | End: 2024-05-03 | Stop reason: WASHOUT

## 2024-01-02 NOTE — PROGRESS NOTES
Reviewed OARRS on 01/02/2024 by KADE Bloom -OARRS has been reviewed and is consistent with prescribed medications, Considered the risks of abuse, dependence, addiction and diversion, Medication is felt to be clinically appropriate based on documented diagnosis

## 2024-01-23 ENCOUNTER — TELEMEDICINE (OUTPATIENT)
Dept: BEHAVIORAL HEALTH | Facility: CLINIC | Age: 29
End: 2024-01-23
Payer: COMMERCIAL

## 2024-01-23 DIAGNOSIS — F33.8 SEASONAL AFFECTIVE DISORDER (CMS-HCC): ICD-10-CM

## 2024-01-23 DIAGNOSIS — F90.0 ATTENTION DEFICIT HYPERACTIVITY DISORDER (ADHD), PREDOMINANTLY INATTENTIVE TYPE: ICD-10-CM

## 2024-01-23 DIAGNOSIS — F33.40 RECURRENT MAJOR DEPRESSIVE DISORDER, IN REMISSION (CMS-HCC): ICD-10-CM

## 2024-01-23 DIAGNOSIS — F64.9 GENDER DYSPHORIA: ICD-10-CM

## 2024-01-23 DIAGNOSIS — F41.9 ANXIETY: ICD-10-CM

## 2024-01-23 PROCEDURE — 99214 OFFICE O/P EST MOD 30 MIN: CPT | Performed by: NURSE PRACTITIONER

## 2024-01-23 RX ORDER — CLOBETASOL PROPIONATE 0.5 MG/G
1 CREAM TOPICAL AS NEEDED
COMMUNITY
Start: 2023-12-16 | End: 2024-03-22 | Stop reason: WASHOUT

## 2024-01-23 ASSESSMENT — ANXIETY QUESTIONNAIRES
5. BEING SO RESTLESS THAT IT IS HARD TO SIT STILL: SEVERAL DAYS
3. WORRYING TOO MUCH ABOUT DIFFERENT THINGS: SEVERAL DAYS
1. FEELING NERVOUS, ANXIOUS, OR ON EDGE: SEVERAL DAYS
7. FEELING AFRAID AS IF SOMETHING AWFUL MIGHT HAPPEN: NOT AT ALL
4. TROUBLE RELAXING: NOT AT ALL
6. BECOMING EASILY ANNOYED OR IRRITABLE: NOT AT ALL
IF YOU CHECKED OFF ANY PROBLEMS ON THIS QUESTIONNAIRE, HOW DIFFICULT HAVE THESE PROBLEMS MADE IT FOR YOU TO DO YOUR WORK, TAKE CARE OF THINGS AT HOME, OR GET ALONG WITH OTHER PEOPLE: NOT DIFFICULT AT ALL
GAD7 TOTAL SCORE: 4
2. NOT BEING ABLE TO STOP OR CONTROL WORRYING: SEVERAL DAYS

## 2024-01-23 ASSESSMENT — PATIENT HEALTH QUESTIONNAIRE - PHQ9
4. FEELING TIRED OR HAVING LITTLE ENERGY: NOT AT ALL
10. IF YOU CHECKED OFF ANY PROBLEMS, HOW DIFFICULT HAVE THESE PROBLEMS MADE IT FOR YOU TO DO YOUR WORK, TAKE CARE OF THINGS AT HOME, OR GET ALONG WITH OTHER PEOPLE: NOT DIFFICULT AT ALL
6. FEELING BAD ABOUT YOURSELF - OR THAT YOU ARE A FAILURE OR HAVE LET YOURSELF OR YOUR FAMILY DOWN: SEVERAL DAYS
1. LITTLE INTEREST OR PLEASURE IN DOING THINGS: SEVERAL DAYS
8. MOVING OR SPEAKING SO SLOWLY THAT OTHER PEOPLE COULD HAVE NOTICED. OR THE OPPOSITE, BEING SO FIGETY OR RESTLESS THAT YOU HAVE BEEN MOVING AROUND A LOT MORE THAN USUAL: NOT AT ALL
2. FEELING DOWN, DEPRESSED OR HOPELESS: SEVERAL DAYS
3. TROUBLE FALLING OR STAYING ASLEEP OR SLEEPING TOO MUCH: SEVERAL DAYS
5. POOR APPETITE OR OVEREATING: NOT AT ALL
9. THOUGHTS THAT YOU WOULD BE BETTER OFF DEAD, OR OF HURTING YOURSELF: NOT AT ALL
7. TROUBLE CONCENTRATING ON THINGS, SUCH AS READING THE NEWSPAPER OR WATCHING TELEVISION: NOT AT ALL

## 2024-01-23 NOTE — PROGRESS NOTES
"Adult Ambulatory Psychiatry Progress Note      Assessment/Plan     Impression:  Sheng Turner is a 28 y.o. transgender male domiciled , employed as  who presents for follow up with CC of \"It's been challenging. I had 2 weeks off over . Grandma had pneumonia, got out of the hospital for , then we went to spend the holidays with Ashley's family and then a week later grandma was in pain and had to go into the hospital and had her appendix removed and she's back home recovering now. I am with her now.\"    Plan: Reviewed and agreed that as he reports and presents himself to be stable overall, no changes to medications no treatment plan were needed. Next appointment in person as patient needs to sign CSA.    Medication: Lexapro 30mg every day, Vyvanse 50mg every day.    Reviewed r/b/a, possible side effects of the medication. Client is aware about the benefit outweighs the risk.     Diagnoses and all orders for this visit:  Anxiety  Attention deficit hyperactivity disorder (ADHD), predominantly inattentive type  Recurrent major depressive disorder, in remission (CMS/HCC)  Seasonal affective disorder (CMS/HCC)  Gender dysphoria      Therapy: none    Other: n/a    Patient is reminded that if there is SI to call [phone] and get themselves to the closest ED for evaluation, otherwise contact me for other questions/concerns.     Subjective   HPI:  \"Both the patient, and family and caregivers and guardians as appropriate, were informed of the current need to conduct treatment via telephone. I have confirmed the patient's identity via the following (minimum of three) acceptable identifiers as per  Policy PH-9: , [place]., home address.\"     Present Illness - anxiety     Characteristics/Recent psychiatric symptoms (pertinent positives and negatives) - reports overall managing his anxiety and depression well, even though grandmother's health issues have been challenging for him. " Talked with his therapist that every year the last 2 months of the year are always a culmination of the 'perfect shit' in his life and seasonal depression, but by end of January starts to feel better again. Reports with Lexapro 30mg in place, feels it helps buffer his overall mental health symptoms. 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 Dental caps for his teeth need to be done still and has to call his dentist. Reports his mother's dementia related to frontaltemporal and aphasia has continued to progressively gotten worse and last time she recognized the patient was right before his birthday in November - admits it's been an emotional rollercoaster ride for himself. Reports mother at this time is 'stable otherwise.' Brother and father are fine. Extended family has not asked after patient's mother well-being but are still expecting him to take on the full time burden of caring for his mother in addition to his wife. Reports sleep varies from 6-8 hours a night and wakes up rested, with intention of getting more sleep. Denies concerns with energy levels nor mental/physical fatigue as the rebounded just 2 weeks ago. Still reports no concerns with racing, ruminating, intrusive, or obsessive thoughts. Continues to deny issues with ADHD and has found the generic version of Vyvanse to work better with his symptoms - as 'it kicks in sooner and lasts a hair longer than the brand for me.'     Onset/timeframe - 3 months  Type - anxiety  Duration -  situational  Aggravating and/or relieving factors/triggers - still having to be the one to take care of his grandmother and her health issues, while also helping with his wife, and being there for his immediate family and mother's decline, as extended family is not helping out, while doing his full time job, and can feel his anxiety cannot impacted but overall feels stable.  Related  symptoms  Treatment and treatment changes (new meds, dosage increases or decreases, med compliance, therapy frequency, etc.) (Past and Recent) - Lexapro 30mg QD, Vyvanse 50mg QD. Reports seeing Frances for therapy once every other Monday.     Issues: Denies SI/HI/AVH.          Review of Systems   Respiratory: Negative.     Cardiovascular: Negative.    Gastrointestinal: Negative.    Genitourinary: Negative.    Musculoskeletal: Negative.        OARRS:  Yuval Chris, APRN-CNP on 1/23/2024  8:28 AM  I have personally reviewed the OARRS report for Shengminerva Turner. I have considered the risks of abuse, dependence, addiction and diversion    Is the patient prescribed a combination of a benzodiazepine and opioid?  No    Last Urine Drug Screen / ordered today: No  Recent Results (from the past 8760 hour(s))   Amphetamine Confirm, Urine    Collection Time: 07/29/23 11:33 AM   Result Value Ref Range    Amphetamines,Urine 808 ng/mL    MDA, Urine <200 ng/mL    MDEA, Urine <200 ng/mL    MDMA, Urine <200 ng/mL    Methamphetamine Quant, Ur <200 ng/mL    Phentermine,Urine <200 ng/mL   Drug Screen, Urine With Reflex to Confirmation    Collection Time: 07/29/23 11:33 AM   Result Value Ref Range    DRUG SCREEN COMMENT URINE SEE BELOW     Amphetamine Screen, Urine PRESUMPTIVE POSITIVE (A) NEGATIVE    Barbiturate Screen, Urine PRESUMPTIVE NEGATIVE NEGATIVE    BENZODIAZEPINE (PRESENCE) IN URINE BY SCREEN METHOD PRESUMPTIVE NEGATIVE NEGATIVE    Cannabinoid Screen, Urine PRESUMPTIVE NEGATIVE NEGATIVE    Cocaine Screen, Urine PRESUMPTIVE NEGATIVE NEGATIVE    Fentanyl, Ur PRESUMPTIVE NEGATIVE NEGATIVE    Methadone Screen, Urine PRESUMPTIVE NEGATIVE NEGATIVE    Opiate Screen, Urine PRESUMPTIVE NEGATIVE NEGATIVE    Oxycodone Screen, Ur PRESUMPTIVE NEGATIVE NEGATIVE    PCP Screen, Urine PRESUMPTIVE NEGATIVE NEGATIVE     Results are as expected.         Controlled Substance Agreement:  Date of the Last Agreement:  10/27/2023  Reviewed Controlled Substance Agreement including but not limited to the benefits, risks, and alternatives to treatment with a Controlled Substance medication(s).    Stimulants:   What is the patient's goal of therapy? Stable attention/focus  Is this being achieved with current treatment? yes    Activities of Daily Living:   Is your overall impression that this patient is benefiting (symptom reduction outweighs side effects) from stimulant therapy? Yes     1. Physical Functioning: Better  2. Family Relationship: Same  3. Social Relationship: Better  4. Mood: Same  5. Sleep Patterns: Better  6. Overall Function: Better      Objective   Mental Status Exam:  General Appearance: Well groomed, appropriate eye contact  Attitude/Behavior: Cooperative, Distracted  Motor: Fidgeting  Speech: Rapid Speech, pressured  Gait/Station: Other:(comment) (sitting at work desk over virtual connection)  Mood: 'ok if not anxious'  Affect: Constricted, Anxious, Congruent with mood and topic of conversation  Thought Process: Linear, goal directed, Perseverative  Thought Associations: Occasional derailment  Thought Content: Normal  Perception: No perceptual abnormalities noted  Sensorium: Alert and oriented to person, place, time and situation  Insight: Intact  Judgement: Intact  Cognition: Cognitively intact to conversational testing with respect to attention, orientation, fund of knowledge, recent and remote memory, and language  Testing: See MMSE    АЛЕКСАНДР-7/PHQ-9 scores reviewed: 4, 4 compared to 5, 4 reflecting improved if not stable anxiety and depression.    Current Medications:  Current Outpatient Medications on File Prior to Visit   Medication Sig Dispense Refill    escitalopram (Lexapro) 10 mg tablet Take 3 tablets (30 mg) by mouth once daily. For seasonal depression 360 tablet 0    lisdexamfetamine (Vyvanse) 50 mg capsule Take 1 capsule (50 mg) by mouth once daily in the morning. 30 capsule 0    [START ON 2/1/2024]  "lisdexamfetamine (Vyvanse) 50 mg capsule Take 1 capsule (50 mg) by mouth once daily in the morning. Do not start before February 1, 2024. 30 capsule 0    [START ON 3/2/2024] lisdexamfetamine (Vyvanse) 50 mg capsule Take 1 capsule (50 mg) by mouth once daily in the morning. Do not start before March 2, 2024. 30 capsule 0    alcohol swabs pads, medicated USE AS DIRECTED.      clindamycin (Clindagel) 1 % gel Apply 1 Application topically 2 times a day.      clobetasol (Temovate) 0.05 % cream Apply 1 Application topically if needed (rash).      diclofenac sodium 1 % kit Apply 1 Application topically 3 times a day as needed (pain).      lisdexamfetamine (Vyvanse) 50 mg capsule Take 1 capsule (50 mg) by mouth once daily in the morning.      loratadine-pseudoephedrine (Claritin-D 24 Hour)  mg 24 hr tablet Take 1 tablet by mouth once daily. 30 tablet 2    magnesium oxide (Mag-Ox) 400 mg tablet Take 1 tablet (400 mg) by mouth once daily.      syringe with needle (Syringe 3cc/22Gx1\") 3 mL 22 gauge x 1\" syringe Inject 1 each into the shoulder, thigh, or buttocks every 14 (fourteen) days.      syringe with needle 12 mL 18 gauge x 1\" syringe 1 each by Does not apply route every 14 (fourteen) days. Use 18G needle to draw up Testosterone, 22G needle to inject.      testosterone cypionate (Depo-Testosterone) 200 mg/mL injection Inject 0.9ml every 2 weeksInject 0.8ml every 2 weeks 10 mL 2     No current facility-administered medications on file prior to visit.       Lab Review:   Lab on 10/14/2023   Component Date Value    Testosterone 10/14/2023 283     Neisseria gonorrhea,Ampl* 10/14/2023 Negative     Chlamydia trachomatis, A* 10/14/2023 Negative     HIV 1/2 Antigen/Antibody* 10/14/2023 Nonreactive     WBC 10/14/2023 8.4     nRBC 10/14/2023 0.0     RBC 10/14/2023 5.27     Hemoglobin 10/14/2023 13.4     Hematocrit 10/14/2023 44.8     MCV 10/14/2023 85     MCH 10/14/2023 25.4 (L)     MCHC 10/14/2023 29.9 (L)     RDW 10/14/2023 " 13.4     Platelets 10/14/2023 328     MPV 10/14/2023 10.1     Syphilis Total Ab 10/14/2023 Nonreactive    Orders Only on 08/17/2023   Component Date Value    Glucose 08/17/2023 82     Sodium 08/17/2023 141     Potassium 08/17/2023 4.5     Chloride 08/17/2023 102     Bicarbonate 08/17/2023 30     Anion Gap 08/17/2023 14     Urea Nitrogen 08/17/2023 17     Creatinine 08/17/2023 1.00     GFR MALE 08/17/2023 >90     Calcium 08/17/2023 9.8     Albumin 08/17/2023 4.8     Alkaline Phosphatase 08/17/2023 96     Total Protein 08/17/2023 7.7     AST 08/17/2023 12     Total Bilirubin 08/17/2023 0.7     ALT (SGPT) 08/17/2023 15     Testosterone 08/17/2023 415     WBC 08/17/2023 9.1     nRBC 08/17/2023 0.0     RBC 08/17/2023 5.57     Hemoglobin 08/17/2023 13.8     Hematocrit 08/17/2023 45.0     MCV 08/17/2023 81     MCHC 08/17/2023 30.7 (L)     Platelets 08/17/2023 336     RDW 08/17/2023 14.1    Orders Only on 07/29/2023   Component Date Value    DRUG SCREEN COMMENT URINE 07/29/2023 SEE BELOW     Amphetamine Screen, Urine 07/29/2023 PRESUMPTIVE POSITIVE (A)     Barbiturate Screen, Urine 07/29/2023 PRESUMPTIVE NEGATIVE     BENZODIAZEPINE (PRESENCE* 07/29/2023 PRESUMPTIVE NEGATIVE     Cannabinoid Screen, Urine 07/29/2023 PRESUMPTIVE NEGATIVE     Cocaine Screen, Urine 07/29/2023 PRESUMPTIVE NEGATIVE     Fentanyl, Ur 07/29/2023 PRESUMPTIVE NEGATIVE     Methadone Screen, Urine 07/29/2023 PRESUMPTIVE NEGATIVE     Opiate Screen, Urine 07/29/2023 PRESUMPTIVE NEGATIVE     Oxycodone Screen, Ur 07/29/2023 PRESUMPTIVE NEGATIVE     PCP Screen, Urine 07/29/2023 PRESUMPTIVE NEGATIVE     Amphetamines,Urine 07/29/2023 808     MDA, Urine 07/29/2023 <200     MDEA, Urine 07/29/2023 <200     MDMA, Urine 07/29/2023 <200     Methamphetamine Quant, Ur 07/29/2023 <200     Phentermine,Urine 07/29/2023 <200          Time Spent:    Prep time: 1 min.  Direct patient time: 28 min.  Documentation time: 6 min.  Total time: 35 min.    Next Appointment:  Follow  up in about 2 months (around 3/23/2024).

## 2024-01-25 ASSESSMENT — ENCOUNTER SYMPTOMS
CARDIOVASCULAR NEGATIVE: 1
RESPIRATORY NEGATIVE: 1
MUSCULOSKELETAL NEGATIVE: 1
GASTROINTESTINAL NEGATIVE: 1

## 2024-03-22 ENCOUNTER — OFFICE VISIT (OUTPATIENT)
Dept: BEHAVIORAL HEALTH | Facility: CLINIC | Age: 29
End: 2024-03-22
Payer: COMMERCIAL

## 2024-03-22 VITALS
WEIGHT: 190.2 LBS | TEMPERATURE: 98.3 F | BODY MASS INDEX: 31.69 KG/M2 | RESPIRATION RATE: 16 BRPM | SYSTOLIC BLOOD PRESSURE: 119 MMHG | HEART RATE: 85 BPM | DIASTOLIC BLOOD PRESSURE: 79 MMHG | HEIGHT: 65 IN

## 2024-03-22 DIAGNOSIS — F33.40 RECURRENT MAJOR DEPRESSIVE DISORDER, IN REMISSION (CMS-HCC): ICD-10-CM

## 2024-03-22 DIAGNOSIS — F90.0 ATTENTION DEFICIT HYPERACTIVITY DISORDER (ADHD), PREDOMINANTLY INATTENTIVE TYPE: Primary | ICD-10-CM

## 2024-03-22 DIAGNOSIS — F33.8 SEASONAL AFFECTIVE DISORDER (CMS-HCC): ICD-10-CM

## 2024-03-22 DIAGNOSIS — G47.9 SLEEP DIFFICULTIES: ICD-10-CM

## 2024-03-22 DIAGNOSIS — F41.9 ANXIETY: ICD-10-CM

## 2024-03-22 DIAGNOSIS — F64.9 GENDER DYSPHORIA: ICD-10-CM

## 2024-03-22 PROCEDURE — 99214 OFFICE O/P EST MOD 30 MIN: CPT | Performed by: NURSE PRACTITIONER

## 2024-03-22 PROCEDURE — 1036F TOBACCO NON-USER: CPT | Performed by: NURSE PRACTITIONER

## 2024-03-22 RX ORDER — ESCITALOPRAM OXALATE 20 MG/1
20 TABLET ORAL DAILY
Qty: 90 TABLET | Refills: 3 | Status: SHIPPED | OUTPATIENT
Start: 2024-03-22 | End: 2025-03-22

## 2024-03-22 RX ORDER — TRAZODONE HYDROCHLORIDE 50 MG/1
100 TABLET ORAL NIGHTLY
Qty: 120 TABLET | Refills: 0 | Status: SHIPPED | OUTPATIENT
Start: 2024-03-22 | End: 2024-05-21

## 2024-03-22 ASSESSMENT — ENCOUNTER SYMPTOMS
RESPIRATORY NEGATIVE: 1
GASTROINTESTINAL NEGATIVE: 1
CARDIOVASCULAR NEGATIVE: 1
MUSCULOSKELETAL NEGATIVE: 1

## 2024-03-22 NOTE — PROGRESS NOTES
"Adult Ambulatory Psychiatry Progress Note      Assessment/Plan     Impression:  Sheng Turner is a 28 y.o. transgender male domiciled , employed as  who presents for follow up with CC of \"It's been a rollercoaster. A friend  last Tuesday. He was in liver failure from what [facility] suspects was Wilsons disease. He had no idea and he  the morning after his birthday. He is only a few months older than me. That was a shock. At work we have a competent  so less stress for me in training. Our one dog still has diarrhea since  and then the other dog had a 1 week explosive diarrhea, recently. So we now have him on a special dog for that costs $90 for 18 lbs and lasts only 2 weeks.\"     Plan: Reviewed and agreed to taking reduced Lexapro dose (reported excessive sweating over recent weeks  that was addressed by covering provider). Adding on Trazodone for sleep as sleep quality has been down for the past 2-3 weeks. Otherwise no other changes to medications or treatment plan.     Medication: Lexapro 20mg every day, Vyvanse 50mg every day. Starts taking Trazodone 50mg at bedtime as needed for sleep aid. Can take from 0.5-3 tablets to adjust for best sleep goal of 7-8 hours/night and minimal to no grogginess in the AM.      Reviewed r/b/a, possible side effects of the medication. Client is aware about the benefit outweighs the risk.     Diagnoses and all orders for this visit:  Anxiety  -     escitalopram (Lexapro) 20 mg tablet; Take 1 tablet (20 mg) by mouth once daily. For seasonal depression  Sleep difficulties  -     traZODone (Desyrel) 50 mg tablet; Take 2 tablets (100 mg) by mouth once daily at bedtime. Can take 1/2 tablet (25mg) up to 100mg (2 tablets).  Seasonal affective disorder (CMS/HCC)  -     escitalopram (Lexapro) 20 mg tablet; Take 1 tablet (20 mg) by mouth once daily. For seasonal depression  Recurrent major depressive disorder, in remission " "(CMS/[facility])  -     escitalopram (Lexapro) 20 mg tablet; Take 1 tablet (20 mg) by mouth once daily. For seasonal depression  Attention deficit hyperactivity disorder (ADHD), predominantly inattentive type  Gender dysphoria      Therapy: none    Other: n/a    Patient is reminded that if there is SI to call [phone] and get themselves to the closest ED for evaluation, otherwise contact me for other questions/concerns.     Subjective   HPI:  \"Both the patient, and family and caregivers and guardians as appropriate, were informed of the current need to conduct treatment via telephone. I have confirmed the patient's identity via the following (minimum of three) acceptable identifiers as per  Policy PH-9: , [place]., home address.\"     Present Illness - anxiety     Characteristics/Recent psychiatric symptoms (pertinent positives and negatives) - reports overall 'things have been pretty good, and nothing too crazy. I was taking the 30mg of the Lexapro for awhile there and then I started to sweat and it wouldn't stop, so I talked with your colleague and we agreed to dropping it back to the 20mg.' Reports put up with the excess sweating, flushed and had initially thought it was d/t the timing with the T-shot as well, for 2 weeks before making the adjustment (extra Serotonin). The excess sweating resolved itself after reducing the dose within 'a couple of days.' Denies any issues with mood/anxiety with the reduction. But since the loss of his friend last Tuesday, he is experiencing waves of grief and anger for several days (around the time others in the company were in the local office for the yearly big company meeting and had to deal with 30 other employees as well). Feels that is more of the issue than anything else lately. Reports his grandmother's health is stable currently. Reports his mother's dementia related to frontotemporal and aphasia has worsened and unless the patient's father tells his mother that 'Jasmyne' is " coming, his mother will not recognize the patient at all. Reports his brother is well. Reports his father is overwhelmed with trying to run his own business, and taking care of his wife and his mother, but his siblings are not stepping up to help out with the patient's grandmother. Reports has his dental caps for his teeth done 2 weeks ago. Reports sleep has been harder to fall asleep and uncertain if it is because of everything in general going on (extra stress in life), taking up to 1+ hours. Reports sleep is down to 5-6 hours every night and feels it is the 'winding down time after work and watching [place] [employer]s and then trying to relax with my wife. But then sometimes it is a second wind, so it is not me thinking or worrying about things.' Admits to waking up tired. Reports stable energy levels nor mental/physical fatigue. Feels the Vyvanse continues to work with managing his ADHD. Appetite is 'the same. I did  some winter fluff unbenownst to me.' Denies concerns with racing, obsessive, ruminating or intrusive thoughts.     Onset/timeframe - 3 months  Type - anxiety  Duration -  situational  Aggravating and/or relieving factors/triggers - still having to be the one to take care of his grandmother and her health issues, while also helping with his wife, and being there for his immediate family and mother's decline, as extended family is not helping out, while doing his full time job, and can feel his anxiety cannot impacted but overall feels stable.  Related symptoms  Treatment and treatment changes (new meds, dosage increases or decreases, med compliance, therapy frequency, etc.) (Past and Recent) - Lexapro 30mg QD, Vyvanse 50mg QD. Reports seeing Frances for therapy once every other Monday.     Issues: Denies SI/HI/AVH.     Excited that he and his fiance have their wedding plans set in motion for this Fall.           Review of Systems   HENT: Negative.     Respiratory: Negative.     Cardiovascular:  Negative.    Gastrointestinal: Negative.    Musculoskeletal: Negative.        OARRS:  Yuval Chris, APRN-CNP on 3/23/2024  1:02 PM  I have personally reviewed the OARRS report for Sheng Turner. I have considered the risks of abuse, dependence, addiction and diversion    Is the patient prescribed a combination of a benzodiazepine and opioid?  No    Last Urine Drug Screen / ordered today: No  Recent Results (from the past 8760 hour(s))   Amphetamine Confirm, Urine    Collection Time: 07/29/23 11:33 AM   Result Value Ref Range    Amphetamines,Urine 808 ng/mL    MDA, Urine <200 ng/mL    MDEA, Urine <200 ng/mL    MDMA, Urine <200 ng/mL    Methamphetamine Quant, Ur <200 ng/mL    Phentermine,Urine <200 ng/mL   Drug Screen, Urine With Reflex to Confirmation    Collection Time: 07/29/23 11:33 AM   Result Value Ref Range    DRUG SCREEN COMMENT URINE SEE BELOW     Amphetamine Screen, Urine PRESUMPTIVE POSITIVE (A) NEGATIVE    Barbiturate Screen, Urine PRESUMPTIVE NEGATIVE NEGATIVE    BENZODIAZEPINE (PRESENCE) IN URINE BY SCREEN METHOD PRESUMPTIVE NEGATIVE NEGATIVE    Cannabinoid Screen, Urine PRESUMPTIVE NEGATIVE NEGATIVE    Cocaine Screen, Urine PRESUMPTIVE NEGATIVE NEGATIVE    Fentanyl, Ur PRESUMPTIVE NEGATIVE NEGATIVE    Methadone Screen, Urine PRESUMPTIVE NEGATIVE NEGATIVE    Opiate Screen, Urine PRESUMPTIVE NEGATIVE NEGATIVE    Oxycodone Screen, Ur PRESUMPTIVE NEGATIVE NEGATIVE    PCP Screen, Urine PRESUMPTIVE NEGATIVE NEGATIVE     Results are as expected.         Controlled Substance Agreement:  Date of the Last Agreement: 10/27/2023  Reviewed Controlled Substance Agreement including but not limited to the benefits, risks, and alternatives to treatment with a Controlled Substance medication(s).    Stimulants:   What is the patient's goal of therapy? Stable attention/focus  Is this being achieved with current treatment? yes    Activities of Daily Living:   Is your overall impression that this patient is  benefiting (symptom reduction outweighs side effects) from stimulant therapy? Yes     1. Physical Functioning: Better  2. Family Relationship: Same  3. Social Relationship: Better  4. Mood: Same  5. Sleep Patterns: Better  6. Overall Function: Better      Objective   Mental Status Exam:  General Appearance: Well groomed, appropriate eye contact  Attitude/Behavior: Cooperative  Motor: Fidgeting  Speech: Rapid Speech, pressured  Gait/Station: WFL - Within functional limits  Mood: 'ok. here. ok.'  Affect: Constricted, Anxious, Increased intensity, Congruent with mood and topic of conversation  Thought Process: Linear, goal directed, Perseverative  Thought Associations: No loosening of associations  Thought Content: Normal, Other: (comment) (work stress, loss of friend unexpectedly last week within 24 hours of his birthday took him by surprise and is grieving, dealing with mother's dementia, and relationships with everyone in his life both good and bad.)  Perception: No perceptual abnormalities noted  Sensorium: Alert and oriented to person, place, time and situation  Insight: Intact  Judgement: Intact  Cognition: Cognitively intact to conversational testing with respect to attention, orientation, fund of knowledge, recent and remote memory, and language  Testing: N/A    АЛЕКСАНДР-7/PHQ-9 scores reviewed: 4, 4 compared to 5, 4 reflecting improved if not stable anxiety and depression.    Current Medications:  Current Outpatient Medications on File Prior to Visit   Medication Sig Dispense Refill    lisdexamfetamine (Vyvanse) 50 mg capsule Take 1 capsule (50 mg) by mouth once daily in the morning.      lisdexamfetamine (Vyvanse) 50 mg capsule Take 1 capsule (50 mg) by mouth once daily in the morning. Do not start before March 2, 2024. 30 capsule 0    [DISCONTINUED] escitalopram (Lexapro) 10 mg tablet Take 3 tablets (30 mg) by mouth once daily. For seasonal depression (Patient taking differently: Take 2 tablets (20 mg) by mouth  "once daily. For seasonal depression) 360 tablet 0    alcohol swabs pads, medicated USE AS DIRECTED.      clindamycin (Clindagel) 1 % gel Apply 1 Application topically 2 times a day.      diclofenac sodium 1 % kit Apply 1 Application topically 3 times a day as needed (pain).      loratadine-pseudoephedrine (Claritin-D 24 Hour)  mg 24 hr tablet Take 1 tablet by mouth once daily. 30 tablet 2    magnesium oxide (Mag-Ox) 400 mg tablet Take 1 tablet (400 mg) by mouth once daily.      syringe with needle (Syringe 3cc/22Gx1\") 3 mL 22 gauge x 1\" syringe Inject 1 each into the shoulder, thigh, or buttocks every 14 (fourteen) days.      syringe with needle 12 mL 18 gauge x 1\" syringe 1 each by Does not apply route every 14 (fourteen) days. Use 18G needle to draw up Testosterone, 22G needle to inject.      testosterone cypionate (Depo-Testosterone) 200 mg/mL injection Inject 0.9ml every 2 weeksInject 0.8ml every 2 weeks 10 mL 2    [DISCONTINUED] clobetasol (Temovate) 0.05 % cream Apply 1 Application topically if needed (rash).      [DISCONTINUED] lisdexamfetamine (Vyvanse) 50 mg capsule Take 1 capsule (50 mg) by mouth once daily in the morning. 30 capsule 0    [DISCONTINUED] lisdexamfetamine (Vyvanse) 50 mg capsule Take 1 capsule (50 mg) by mouth once daily in the morning. Do not start before February 1, 2024. 30 capsule 0     No current facility-administered medications on file prior to visit.       Lab Review:   Lab on 10/14/2023   Component Date Value    Testosterone 10/14/2023 283     Neisseria gonorrhea,Ampl* 10/14/2023 Negative     Chlamydia trachomatis, A* 10/14/2023 Negative     HIV 1/2 Antigen/Antibody* 10/14/2023 Nonreactive     WBC 10/14/2023 8.4     nRBC 10/14/2023 0.0     RBC 10/14/2023 5.27     Hemoglobin 10/14/2023 13.4     Hematocrit 10/14/2023 44.8     MCV 10/14/2023 85     MCH 10/14/2023 25.4 (L)     MCHC 10/14/2023 29.9 (L)     RDW 10/14/2023 13.4     Platelets 10/14/2023 328     MPV 10/14/2023 10.1     " Syphilis Total Ab 10/14/2023 Nonreactive          Time Spent:    Prep time: 1 min.  Direct patient time: 26 min.  Documentation time: 8 min.  Total time: 35 min.    Next Appointment:  Follow up in about 6 weeks (around 5/3/2024).

## 2024-03-23 RX ORDER — LISDEXAMFETAMINE DIMESYLATE 50 MG/1
50 CAPSULE ORAL EVERY MORNING
Qty: 30 CAPSULE | Refills: 0 | Status: SHIPPED | OUTPATIENT
Start: 2024-04-01 | End: 2024-05-03 | Stop reason: WASHOUT

## 2024-03-23 RX ORDER — LISDEXAMFETAMINE DIMESYLATE 50 MG/1
50 CAPSULE ORAL EVERY MORNING
Qty: 30 CAPSULE | Refills: 0 | Status: SHIPPED | OUTPATIENT
Start: 2024-05-01 | End: 2024-05-31

## 2024-03-23 RX ORDER — LISDEXAMFETAMINE DIMESYLATE 50 MG/1
50 CAPSULE ORAL EVERY MORNING
Qty: 30 CAPSULE | Refills: 0 | Status: SHIPPED | OUTPATIENT
Start: 2024-05-31 | End: 2024-06-30

## 2024-04-10 ENCOUNTER — APPOINTMENT (OUTPATIENT)
Dept: PRIMARY CARE | Facility: CLINIC | Age: 29
End: 2024-04-10
Payer: COMMERCIAL

## 2024-04-15 ENCOUNTER — OFFICE VISIT (OUTPATIENT)
Dept: PRIMARY CARE | Facility: CLINIC | Age: 29
End: 2024-04-15
Payer: COMMERCIAL

## 2024-04-15 VITALS
TEMPERATURE: 98.2 F | OXYGEN SATURATION: 98 % | BODY MASS INDEX: 31.49 KG/M2 | RESPIRATION RATE: 16 BRPM | HEIGHT: 65 IN | WEIGHT: 189 LBS | SYSTOLIC BLOOD PRESSURE: 134 MMHG | HEART RATE: 119 BPM | DIASTOLIC BLOOD PRESSURE: 84 MMHG

## 2024-04-15 DIAGNOSIS — F64.9 GENDER DYSPHORIA: Primary | ICD-10-CM

## 2024-04-15 DIAGNOSIS — F41.9 ANXIETY: ICD-10-CM

## 2024-04-15 LAB
CHOLEST SERPL-MCNC: 160 MG/DL (ref 0–199)
CHOLESTEROL/HDL RATIO: 4.4
HDLC SERPL-MCNC: 36.7 MG/DL
LDLC SERPL CALC-MCNC: 94 MG/DL
NON HDL CHOLESTEROL: 123 MG/DL (ref 0–149)
TESTOST SERPL-MCNC: 1122 NG/DL (ref 0–1000)
TRIGL SERPL-MCNC: 148 MG/DL (ref 0–149)
VLDL: 30 MG/DL (ref 0–40)

## 2024-04-15 PROCEDURE — 84403 ASSAY OF TOTAL TESTOSTERONE: CPT | Performed by: FAMILY MEDICINE

## 2024-04-15 PROCEDURE — 99213 OFFICE O/P EST LOW 20 MIN: CPT | Performed by: FAMILY MEDICINE

## 2024-04-15 PROCEDURE — 80061 LIPID PANEL: CPT | Performed by: FAMILY MEDICINE

## 2024-04-15 PROCEDURE — 36415 COLL VENOUS BLD VENIPUNCTURE: CPT | Performed by: FAMILY MEDICINE

## 2024-04-15 PROCEDURE — 85027 COMPLETE CBC AUTOMATED: CPT | Performed by: FAMILY MEDICINE

## 2024-04-15 RX ORDER — TESTOSTERONE CYPIONATE 200 MG/ML
INJECTION, SOLUTION INTRAMUSCULAR
Qty: 10 ML | Refills: 2 | Status: SHIPPED | OUTPATIENT
Start: 2024-04-15

## 2024-04-15 ASSESSMENT — ENCOUNTER SYMPTOMS
MUSCULOSKELETAL NEGATIVE: 1
PSYCHIATRIC NEGATIVE: 1
NEUROLOGICAL NEGATIVE: 1
DEPRESSION: 0
ENDOCRINE NEGATIVE: 1
OCCASIONAL FEELINGS OF UNSTEADINESS: 0
RESPIRATORY NEGATIVE: 1
LOSS OF SENSATION IN FEET: 0
CARDIOVASCULAR NEGATIVE: 1
CONSTITUTIONAL NEGATIVE: 1

## 2024-04-15 ASSESSMENT — PATIENT HEALTH QUESTIONNAIRE - PHQ9
1. LITTLE INTEREST OR PLEASURE IN DOING THINGS: NOT AT ALL
2. FEELING DOWN, DEPRESSED OR HOPELESS: NOT AT ALL
SUM OF ALL RESPONSES TO PHQ9 QUESTIONS 1 AND 2: 0

## 2024-04-15 ASSESSMENT — PAIN SCALES - GENERAL: PAINLEVEL: 0-NO PAIN

## 2024-04-15 NOTE — PROGRESS NOTES
"Subjective   Patient ID: Sheng Turner is a 28 y.o. adult male who presents for Follow-up.    #gender affirming care  Doing well, satisfied with current testosterone regimen. No longer has significant fluctuations.     #MDD/ADHD  Multiple recent life stressors (death in family, illnesses in family) but coping appropriately. Sees therapist weekly & follows with psychiatry; is satisfied with care.   Lexapro reduced to 20mg.     #Health screening  No new sexual partners in last 3 months, does not desire STD testing at this visit      Review of Systems   Constitutional: Negative.    HENT: Negative.     Respiratory: Negative.     Cardiovascular: Negative.    Endocrine: Negative.    Musculoskeletal: Negative.    Neurological: Negative.    Psychiatric/Behavioral: Negative.     All other systems reviewed and are negative.      Objective   /84   Pulse (!) 119   Temp 36.8 °C (98.2 °F)   Resp 16   Ht 1.651 m (5' 5\")   Wt 85.7 kg (189 lb)   SpO2 98%   BMI 31.45 kg/m²     Physical Exam  Vitals reviewed.   Constitutional:       Appearance: Normal appearance.   HENT:      Head: Normocephalic and atraumatic.      Right Ear: External ear normal.      Left Ear: External ear normal.      Nose: Nose normal.   Eyes:      Pupils: Pupils are equal, round, and reactive to light.   Cardiovascular:      Rate and Rhythm: Normal rate and regular rhythm.      Heart sounds: Normal heart sounds.      Comments: Normal rate during exam (90s)  Pulmonary:      Effort: Pulmonary effort is normal.      Breath sounds: Normal breath sounds.   Abdominal:      General: There is no distension.      Palpations: Abdomen is soft.      Tenderness: There is no abdominal tenderness.   Musculoskeletal:         General: Normal range of motion.      Cervical back: Normal range of motion and neck supple.   Skin:     General: Skin is warm and dry.   Neurological:      General: No focal deficit present.      Mental Status: He is alert and " oriented to person, place, and time.   Psychiatric:         Mood and Affect: Mood normal.         Behavior: Behavior normal.       Assessment/Plan   28M here for follow-up    #gender affirming care - stable  - Continue current testosterone regimen  - Testosterone agreement signed  - Labs: testosterone, CBC ordered    #MDD/ADHD - stable  ::Lexapro decreased to 20mg  - Continue current regimen    #Health maintenance  - Lipid panel ordered  - Pap not indicated as pt is s/p total hysterectomy      Kelsie Fernandez  MS3     Patient was seen and examined by myself in conjunction with medical student. I have edited note above. Estefany Graves

## 2024-04-16 LAB
ERYTHROCYTE [DISTWIDTH] IN BLOOD BY AUTOMATED COUNT: 13.4 % (ref 11.5–14.5)
HCT VFR BLD AUTO: 43.7 % (ref 36–52)
HGB BLD-MCNC: 13.8 G/DL (ref 12–17.5)
MCH RBC QN AUTO: 25.6 PG (ref 26–34)
MCHC RBC AUTO-ENTMCNC: 31.6 G/DL (ref 32–36)
MCV RBC AUTO: 81 FL (ref 80–100)
NRBC BLD-RTO: 0 /100 WBCS (ref 0–0)
PLATELET # BLD AUTO: 327 X10*3/UL (ref 150–450)
RBC # BLD AUTO: 5.4 X10*6/UL (ref 4–5.9)
WBC # BLD AUTO: 7.1 X10*3/UL (ref 4.4–11.3)

## 2024-05-03 ENCOUNTER — TELEMEDICINE (OUTPATIENT)
Dept: BEHAVIORAL HEALTH | Facility: CLINIC | Age: 29
End: 2024-05-03
Payer: COMMERCIAL

## 2024-05-03 DIAGNOSIS — F64.9 GENDER DYSPHORIA: ICD-10-CM

## 2024-05-03 DIAGNOSIS — F90.0 ATTENTION DEFICIT HYPERACTIVITY DISORDER (ADHD), PREDOMINANTLY INATTENTIVE TYPE: ICD-10-CM

## 2024-05-03 DIAGNOSIS — F33.40 RECURRENT MAJOR DEPRESSIVE DISORDER, IN REMISSION (CMS-HCC): ICD-10-CM

## 2024-05-03 DIAGNOSIS — F41.1 GAD (GENERALIZED ANXIETY DISORDER): Primary | ICD-10-CM

## 2024-05-03 PROCEDURE — 99214 OFFICE O/P EST MOD 30 MIN: CPT | Performed by: NURSE PRACTITIONER

## 2024-05-03 PROCEDURE — 1036F TOBACCO NON-USER: CPT | Performed by: NURSE PRACTITIONER

## 2024-05-03 RX ORDER — LISDEXAMFETAMINE DIMESYLATE 50 MG/1
50 CAPSULE ORAL EVERY MORNING
Qty: 30 CAPSULE | Refills: 0 | Status: SHIPPED | OUTPATIENT
Start: 2024-06-30 | End: 2024-07-30

## 2024-05-03 RX ORDER — LISDEXAMFETAMINE DIMESYLATE 50 MG/1
50 CAPSULE ORAL EVERY MORNING
Qty: 30 CAPSULE | Refills: 0 | Status: SHIPPED | OUTPATIENT
Start: 2024-07-30 | End: 2024-08-29

## 2024-05-03 RX ORDER — LISDEXAMFETAMINE DIMESYLATE 50 MG/1
50 CAPSULE ORAL EVERY MORNING
Qty: 30 CAPSULE | Refills: 0 | Status: SHIPPED | OUTPATIENT
Start: 2024-08-29 | End: 2024-09-28

## 2024-05-03 ASSESSMENT — ANXIETY QUESTIONNAIRES
7. FEELING AFRAID AS IF SOMETHING AWFUL MIGHT HAPPEN: SEVERAL DAYS
3. WORRYING TOO MUCH ABOUT DIFFERENT THINGS: SEVERAL DAYS
1. FEELING NERVOUS, ANXIOUS, OR ON EDGE: SEVERAL DAYS
IF YOU CHECKED OFF ANY PROBLEMS ON THIS QUESTIONNAIRE, HOW DIFFICULT HAVE THESE PROBLEMS MADE IT FOR YOU TO DO YOUR WORK, TAKE CARE OF THINGS AT HOME, OR GET ALONG WITH OTHER PEOPLE: SOMEWHAT DIFFICULT
GAD7 TOTAL SCORE: 9
5. BEING SO RESTLESS THAT IT IS HARD TO SIT STILL: MORE THAN HALF THE DAYS
2. NOT BEING ABLE TO STOP OR CONTROL WORRYING: SEVERAL DAYS
6. BECOMING EASILY ANNOYED OR IRRITABLE: SEVERAL DAYS
4. TROUBLE RELAXING: MORE THAN HALF THE DAYS

## 2024-05-03 ASSESSMENT — PATIENT HEALTH QUESTIONNAIRE - PHQ9
6. FEELING BAD ABOUT YOURSELF - OR THAT YOU ARE A FAILURE OR HAVE LET YOURSELF OR YOUR FAMILY DOWN: SEVERAL DAYS
9. THOUGHTS THAT YOU WOULD BE BETTER OFF DEAD, OR OF HURTING YOURSELF: NOT AT ALL
8. MOVING OR SPEAKING SO SLOWLY THAT OTHER PEOPLE COULD HAVE NOTICED. OR THE OPPOSITE, BEING SO FIGETY OR RESTLESS THAT YOU HAVE BEEN MOVING AROUND A LOT MORE THAN USUAL: NOT AT ALL
5. POOR APPETITE OR OVEREATING: SEVERAL DAYS
7. TROUBLE CONCENTRATING ON THINGS, SUCH AS READING THE NEWSPAPER OR WATCHING TELEVISION: NOT AT ALL
3. TROUBLE FALLING OR STAYING ASLEEP OR SLEEPING TOO MUCH: SEVERAL DAYS
10. IF YOU CHECKED OFF ANY PROBLEMS, HOW DIFFICULT HAVE THESE PROBLEMS MADE IT FOR YOU TO DO YOUR WORK, TAKE CARE OF THINGS AT HOME, OR GET ALONG WITH OTHER PEOPLE: NOT DIFFICULT AT ALL
4. FEELING TIRED OR HAVING LITTLE ENERGY: NOT AT ALL
1. LITTLE INTEREST OR PLEASURE IN DOING THINGS: NOT AT ALL
2. FEELING DOWN, DEPRESSED OR HOPELESS: NOT AT ALL

## 2024-05-03 ASSESSMENT — ENCOUNTER SYMPTOMS
NERVOUS/ANXIOUS: 1
CONSTITUTIONAL NEGATIVE: 1

## 2024-05-03 NOTE — PROGRESS NOTES
"Adult Ambulatory Psychiatry Progress Note      Assessment/Plan     Impression:  Sheng Turner is a 28 y.o. transgender male domiciled , employed as  who presents for follow up with CC of \"Things have been a little crazy lately, but that is normal for me. The Trazodone does work. I will take 1/2 a pill and 1/4 a pill and that is about enough for me, otherwise 1 whole pill is too much.\"    Plan: Patient was cooperative yet nervous. Still reports sweating but not as certain if it is the medication. Reviewed and agreed to keeping medication on lowered Lexapro dose. Addition of Trazodone is helping with sleep, so no other changes to medications or treatment plan.     Medication: Lexapro 20mg every day, Vyvanse 50mg every day. Trazodone 37.5mg at bedtime as needed for sleep aid.       Reviewed r/b/a, possible side effects of the medication. Client is aware about the benefit outweighs the risk.     Diagnoses and all orders for this visit:  АЛЕКСАНДР (generalized anxiety disorder)  Attention deficit hyperactivity disorder (ADHD), predominantly inattentive type  -     Amphetamine Confirm, Urine; Future  -     lisdexamfetamine (Vyvanse) 50 mg capsule; Take 1 capsule (50 mg) by mouth once daily in the morning. Do not fill before June 30, 2024.  -     lisdexamfetamine (Vyvanse) 50 mg capsule; Take 1 capsule (50 mg) by mouth once daily in the morning. Do not fill before July 30, 2024.  -     lisdexamfetamine (Vyvanse) 50 mg capsule; Take 1 capsule (50 mg) by mouth once daily in the morning. Do not fill before August 29, 2024.  Recurrent major depressive disorder, in remission (CMS-HCC)  Gender dysphoria        Therapy: none    Other: n/a    Patient is reminded that if there is SI to call [phone] and get themselves to the closest ED for evaluation, otherwise contact me for other questions/concerns.     Subjective   HPI:  \"Both the patient, and family and caregivers and guardians as appropriate, were " "informed of the current need to conduct treatment via telephone. I have confirmed the patient's identity via the following (minimum of three) acceptable identifiers as per  Policy PH-9: , [place] home address.\"     Virtual or Telephone Consent    An interactive audio and video telecommunication system which permits real time communications between the patient (at the originating site) and provider (at the distant site) was utilized to provide this telehealth service.   Verbal consent was requested and obtained from Sheng Turner on this date, 24 for a telehealth visit.     Present Illness - anxiety     Characteristics/Recent psychiatric symptoms (pertinent positives and negatives) - reports stressing out over family issues - grandmother is back in the hospital now dealing with kidney stones and as his dad is the one to make medical decisions for her, along with both the patient and his dad are the only ones who are doing mostly direct support for her, yet extended family keep wanting to insert themselves in her care, saying that they are not doing enough, or doing the right type of care, is starting to upset him. Reports his father is the one handling the family drama with boundaries and the patient is ignoring family members calling him 'as I don't have the time to deal with their bullshit.' Reports grandmother is currently stabilized in the hospital but doctors are deciding whether to do more surgery to remove remaining stones or try and let the rest pass through. Reports noting sweating still and wondered about dropping dose of lexapro further but is feeling overall stress levels are higher, temps outside are going up even with a/c on in the house and their one cat like to sleep on top of him. Reports is experiencing some grief for his fiance's uncle as he  unexpectedly d/t medication error while being transported for surgery to clear out clogged arteries. Reports also 2 other deaths " recently (1 unexpected - at age 22  in his sleep from seizure without prior hx) also his fiance's friends and feels sad for his fiance as she is grieving heavily for all. Reports his mother is going down further with frontotemporal dementia and aphasia (limited phrases) and his father has to closely monitor her for wondering away from the house (goes outside of the house to  leaves in the yard by poking them with sticks) and admits watching his mother's neuro decline over the past 3 years 'has been rough, where we have not had any conversation now. She has a set routine and if she doesn't want to do something she won't do it, so it is frustrating to deal with.' Reports his brother who has been away at college is coming home, has been able to help out and his mother will listen to his brother when asked to be redirected. Reports his fiance is 'doing ok with everything going on. She got a job offer and was thinking about taking it but after talking with a friend there, decided not to take it.' Admits work load has been manageable, but does note that he and his fiance have had some conflicts/fights over the past two weeks with her work stress being higher. Admits being underappreciated and 'being dumped on and we (he and Ashley, his fiance) talked about things and it was also about COVID, his mother dying, and I told her that I feel I keep doing everything at home, and something has to give.' Reports taking Trazodone 37.5mg 4-5 nights a week and gets 7 hours of uninterrupted sleep during work nights and on weekends will stay up late to watch serial killer documentaries, so sleep will vary. Denies concerns with energy levels and mental/physical fatigue. Feels the Vyvanse continues to work with managing his ADHD. Appetite is 'normal for me..' Still denies concerns with racing, obsessive, ruminating or intrusive thoughts.     Onset/timeframe - 3 months  Type - anxiety  Duration -  situational  Aggravating and/or  relieving factors/triggers - still having to be the one to take care of his grandmother and her health issues, while also helping with his wife, and being there for his immediate family and mother's decline, as extended family is not helping out, while doing his full time job, and can feel his anxiety cannot impacted but overall feels stable.  Related symptoms  Treatment and treatment changes (new meds, dosage increases or decreases, med compliance, therapy frequency, etc.) (Past and Recent) - Lexapro 30mg QD, Vyvanse 50mg QD. Reports seeing Frances for therapy once every other Monday.     Issues: Denies SI/HI/AVH.     Excited that he and his fiance have their wedding plans set in motion for this Fall.           Review of Systems   Constitutional: Negative.    HENT: Negative.     Psychiatric/Behavioral:  The patient is nervous/anxious.        OARRS:  Yuval Chris, APRN-CNP on 5/3/2024  5:50 PM  I have personally reviewed the OARRS report for Sheng Turner. I have considered the risks of abuse, dependence, addiction and diversion    Is the patient prescribed a combination of a benzodiazepine and opioid?  No    Last Urine Drug Screen / ordered today: No  Recent Results (from the past 8760 hour(s))   Amphetamine Confirm, Urine    Collection Time: 07/29/23 11:33 AM   Result Value Ref Range    Amphetamines,Urine 808 ng/mL    MDA, Urine <200 ng/mL    MDEA, Urine <200 ng/mL    MDMA, Urine <200 ng/mL    Methamphetamine Quant, Ur <200 ng/mL    Phentermine,Urine <200 ng/mL   Drug Screen, Urine With Reflex to Confirmation    Collection Time: 07/29/23 11:33 AM   Result Value Ref Range    DRUG SCREEN COMMENT URINE SEE BELOW     Amphetamine Screen, Urine PRESUMPTIVE POSITIVE (A) NEGATIVE    Barbiturate Screen, Urine PRESUMPTIVE NEGATIVE NEGATIVE    BENZODIAZEPINE (PRESENCE) IN URINE BY SCREEN METHOD PRESUMPTIVE NEGATIVE NEGATIVE    Cannabinoid Screen, Urine PRESUMPTIVE NEGATIVE NEGATIVE    Cocaine Screen, Urine  PRESUMPTIVE NEGATIVE NEGATIVE    Fentanyl, Ur PRESUMPTIVE NEGATIVE NEGATIVE    Methadone Screen, Urine PRESUMPTIVE NEGATIVE NEGATIVE    Opiate Screen, Urine PRESUMPTIVE NEGATIVE NEGATIVE    Oxycodone Screen, Ur PRESUMPTIVE NEGATIVE NEGATIVE    PCP Screen, Urine PRESUMPTIVE NEGATIVE NEGATIVE     Results are as expected.         Controlled Substance Agreement:  Date of the Last Agreement: 10/27/2023  Reviewed Controlled Substance Agreement including but not limited to the benefits, risks, and alternatives to treatment with a Controlled Substance medication(s).    Stimulants:   What is the patient's goal of therapy? Stable attention/focus  Is this being achieved with current treatment? yes    Activities of Daily Living:   Is your overall impression that this patient is benefiting (symptom reduction outweighs side effects) from stimulant therapy? Yes     1. Physical Functioning: Better  2. Family Relationship: Same  3. Social Relationship: Same  4. Mood: Same  5. Sleep Patterns: Better  6. Overall Function: Better      Objective   Mental Status Exam:  General Appearance: Well groomed, appropriate eye contact  Attitude/Behavior: Cooperative, Distracted, Fair eye contact  Motor: Fidgeting  Speech: Normal rate, volume, prosody  Gait/Station: Other:(comment) (standing in front of standing desk at work, over virtual connection)  Mood: 'stressed'  Affect: Constricted, Anxious, Congruent with mood and topic of conversation  Thought Process: Linear, goal directed, Flight of ideas  Thought Associations: No loosening of associations  Thought Content: Normal  Perception: No perceptual abnormalities noted  Sensorium: Alert and oriented to person, place, time and situation  Insight: Intact  Judgement: Intact  Cognition: Cognitively intact to conversational testing with respect to attention, orientation, fund of knowledge, recent and remote memory, and language  Testing: N/A    АЛЕКСАНДР-7/PHQ-9 scores reviewed: 9, 3 compared to 4, 4  "reflecting return in anxiety yet stable depression.    Current Medications:  Current Outpatient Medications on File Prior to Visit   Medication Sig Dispense Refill    alcohol swabs pads, medicated USE AS DIRECTED.      diclofenac sodium 1 % kit Apply 1 Application topically 3 times a day as needed (pain).      escitalopram (Lexapro) 20 mg tablet Take 1 tablet (20 mg) by mouth once daily. For seasonal depression 90 tablet 3    lisdexamfetamine (Vyvanse) 50 mg capsule Take 1 capsule (50 mg) by mouth once daily in the morning. Do not start before May 1, 2024. 30 capsule 0    [START ON 5/31/2024] lisdexamfetamine (Vyvanse) 50 mg capsule Take 1 capsule (50 mg) by mouth once daily in the morning. Do not start before May 31, 2024. 30 capsule 0    loratadine-pseudoephedrine (Claritin-D 24 Hour)  mg 24 hr tablet Take 1 tablet by mouth once daily. 30 tablet 2    magnesium oxide (Mag-Ox) 400 mg tablet Take 1 tablet (400 mg) by mouth once daily.      syringe with needle (Syringe 3cc/22Gx1\") 3 mL 22 gauge x 1\" syringe Inject 1 each into the shoulder, thigh, or buttocks every 14 (fourteen) days.      syringe with needle 12 mL 18 gauge x 1\" syringe 1 each by Does not apply route every 14 (fourteen) days. Use 18G needle to draw up Testosterone, 22G needle to inject.      testosterone cypionate (Depo-Testosterone) 200 mg/mL injection Inject 0.9ml every 2 weeks 10 mL 2    traZODone (Desyrel) 50 mg tablet Take 2 tablets (100 mg) by mouth once daily at bedtime. Can take 1/2 tablet (25mg) up to 100mg (2 tablets). (Patient taking differently: Take 1 tablet (50 mg) by mouth once daily at bedtime. Takes 37.5mg nightly.) 120 tablet 0    lisdexamfetamine (Vyvanse) 50 mg capsule Take 1 capsule (50 mg) by mouth once daily in the morning.      [DISCONTINUED] lisdexamfetamine (Vyvanse) 50 mg capsule Take 1 capsule (50 mg) by mouth once daily in the morning. Do not start before March 2, 2024. 30 capsule 0    [DISCONTINUED] lisdexamfetamine " (Vyvanse) 50 mg capsule Take 1 capsule (50 mg) by mouth once daily in the morning. Do not start before April 1, 2024. 30 capsule 0    [DISCONTINUED] loratadine-pseudoephedrine (Claritin-D 24 Hour)  mg 24 hr tablet Take 1 tablet by mouth once daily. 30 tablet 2     No current facility-administered medications on file prior to visit.       Lab Review:   Office Visit on 04/15/2024   Component Date Value    WBC 04/15/2024 7.1     nRBC 04/15/2024 0.0     RBC 04/15/2024 5.40     Hemoglobin 04/15/2024 13.8     Hematocrit 04/15/2024 43.7     MCV 04/15/2024 81     MCH 04/15/2024 25.6 (L)     MCHC 04/15/2024 31.6 (L)     RDW 04/15/2024 13.4     Platelets 04/15/2024 327     Testosterone 04/15/2024 1,122 (H)     Cholesterol 04/15/2024 160     HDL-Cholesterol 04/15/2024 36.7     Cholesterol/HDL Ratio 04/15/2024 4.4     LDL Calculated 04/15/2024 94     VLDL 04/15/2024 30     Triglycerides 04/15/2024 148     Non HDL Cholesterol 04/15/2024 123          Time Spent:    Prep time: 1 min.  Direct patient time: 28 min.  Documentation time: 8 min.  Total time: 37 min.    Next Appointment:  Follow up in about 2 months (around 7/3/2024).

## 2024-06-10 DIAGNOSIS — J30.9 ALLERGIC RHINITIS, UNSPECIFIED SEASONALITY, UNSPECIFIED TRIGGER: ICD-10-CM

## 2024-06-10 RX ORDER — LORATADINE PSEUDOEPHEDRINE SULFATE 10; 240 MG/1; MG/1
1 TABLET, EXTENDED RELEASE ORAL DAILY
Qty: 30 TABLET | Refills: 2 | Status: SHIPPED | OUTPATIENT
Start: 2024-06-10

## 2024-07-05 ENCOUNTER — APPOINTMENT (OUTPATIENT)
Dept: BEHAVIORAL HEALTH | Facility: CLINIC | Age: 29
End: 2024-07-05
Payer: COMMERCIAL

## 2024-07-05 DIAGNOSIS — F90.0 ATTENTION DEFICIT HYPERACTIVITY DISORDER (ADHD), PREDOMINANTLY INATTENTIVE TYPE: ICD-10-CM

## 2024-07-05 DIAGNOSIS — F33.8 SEASONAL AFFECTIVE DISORDER (CMS-HCC): ICD-10-CM

## 2024-07-05 DIAGNOSIS — F41.1 GAD (GENERALIZED ANXIETY DISORDER): ICD-10-CM

## 2024-07-05 DIAGNOSIS — F33.1 MODERATE EPISODE OF RECURRENT MAJOR DEPRESSIVE DISORDER (MULTI): Primary | ICD-10-CM

## 2024-07-05 DIAGNOSIS — F64.9 GENDER DYSPHORIA: ICD-10-CM

## 2024-07-05 PROCEDURE — 99214 OFFICE O/P EST MOD 30 MIN: CPT | Performed by: NURSE PRACTITIONER

## 2024-07-05 RX ORDER — BUPROPION HYDROCHLORIDE 150 MG/1
150 TABLET ORAL DAILY
Qty: 60 TABLET | Refills: 0 | Status: SHIPPED | OUTPATIENT
Start: 2024-07-05 | End: 2024-09-03

## 2024-07-05 ASSESSMENT — ENCOUNTER SYMPTOMS: CONSTITUTIONAL NEGATIVE: 1

## 2024-07-05 ASSESSMENT — PATIENT HEALTH QUESTIONNAIRE - PHQ9
5. POOR APPETITE OR OVEREATING: SEVERAL DAYS
10. IF YOU CHECKED OFF ANY PROBLEMS, HOW DIFFICULT HAVE THESE PROBLEMS MADE IT FOR YOU TO DO YOUR WORK, TAKE CARE OF THINGS AT HOME, OR GET ALONG WITH OTHER PEOPLE: SOMEWHAT DIFFICULT
2. FEELING DOWN, DEPRESSED OR HOPELESS: NEARLY EVERY DAY
4. FEELING TIRED OR HAVING LITTLE ENERGY: MORE THAN HALF THE DAYS
1. LITTLE INTEREST OR PLEASURE IN DOING THINGS: NEARLY EVERY DAY
3. TROUBLE FALLING OR STAYING ASLEEP OR SLEEPING TOO MUCH: NEARLY EVERY DAY
6. FEELING BAD ABOUT YOURSELF - OR THAT YOU ARE A FAILURE OR HAVE LET YOURSELF OR YOUR FAMILY DOWN: NOT AT ALL
7. TROUBLE CONCENTRATING ON THINGS, SUCH AS READING THE NEWSPAPER OR WATCHING TELEVISION: SEVERAL DAYS
8. MOVING OR SPEAKING SO SLOWLY THAT OTHER PEOPLE COULD HAVE NOTICED. OR THE OPPOSITE, BEING SO FIGETY OR RESTLESS THAT YOU HAVE BEEN MOVING AROUND A LOT MORE THAN USUAL: NOT AT ALL
9. THOUGHTS THAT YOU WOULD BE BETTER OFF DEAD, OR OF HURTING YOURSELF: NOT AT ALL

## 2024-07-05 ASSESSMENT — ANXIETY QUESTIONNAIRES
2. NOT BEING ABLE TO STOP OR CONTROL WORRYING: MORE THAN HALF THE DAYS
5. BEING SO RESTLESS THAT IT IS HARD TO SIT STILL: NOT AT ALL
GAD7 TOTAL SCORE: 5
7. FEELING AFRAID AS IF SOMETHING AWFUL MIGHT HAPPEN: NOT AT ALL
4. TROUBLE RELAXING: NOT AT ALL
IF YOU CHECKED OFF ANY PROBLEMS ON THIS QUESTIONNAIRE, HOW DIFFICULT HAVE THESE PROBLEMS MADE IT FOR YOU TO DO YOUR WORK, TAKE CARE OF THINGS AT HOME, OR GET ALONG WITH OTHER PEOPLE: SOMEWHAT DIFFICULT
3. WORRYING TOO MUCH ABOUT DIFFERENT THINGS: SEVERAL DAYS
1. FEELING NERVOUS, ANXIOUS, OR ON EDGE: SEVERAL DAYS
6. BECOMING EASILY ANNOYED OR IRRITABLE: SEVERAL DAYS

## 2024-07-05 NOTE — PROGRESS NOTES
"Adult Ambulatory Psychiatry Progress Note      Assessment/Plan     Impression:  Sheng Turner is a 28 y.o. transgender male domiciled , employed as  who presents for follow up with CC of \"Things have been alright. I did have a bit of a depressive spell for much of June. It felt like my seasonal depression but it was hitting me in June, so I don't know why. In the last couple of days, it has lifted, yes.\"    Plan: Patient was cooperative yet down. Reports less sweating. With reported depression (feels akin to seasonal depression), reviewed and agreed to adding on Wellbutrin but leave other medications and treatment plan in place.      Medication: Starts taking Wellbutrin XL 150mg every day. Lexapro 20mg every day, Vyvanse 50mg every day. Trazodone 37.5mg at bedtime as needed for sleep aid.       Reviewed r/b/a, possible side effects of the medication. Client is aware about the benefit outweighs the risk.     Diagnoses and all orders for this visit:  Moderate episode of recurrent major depressive disorder (Multi)  -     buPROPion XL (Wellbutrin XL) 150 mg 24 hr tablet; Take 1 tablet (150 mg) by mouth once daily. Do not crush, chew, or split.  Seasonal affective disorder (CMS-HCC)  -     buPROPion XL (Wellbutrin XL) 150 mg 24 hr tablet; Take 1 tablet (150 mg) by mouth once daily. Do not crush, chew, or split.  АЛЕКСАНДР (generalized anxiety disorder)  Gender dysphoria  Attention deficit hyperactivity disorder (ADHD), predominantly inattentive type          Therapy: none    Other: n/a    Patient is reminded that if there is SI to call [phone] and get themselves to the closest ED for evaluation, otherwise contact me for other questions/concerns.     Subjective   HPI:  i89\"Both the patient, and family and caregivers and guardians as appropriate, were informed of the current need to conduct treatment via telephone. I have confirmed the patient's identity via the following (minimum of three) " "acceptable identifiers as per  Policy PH-9: , [place]., home address.\"     Virtual or Telephone Consent    An interactive audio and video telecommunication system which permits real time communications between the patient (at the originating site) and provider (at the distant site) was utilized to provide this telehealth service.   Verbal consent was requested and obtained from Sheng Gray Josecory on this date, 24 for a telehealth visit.     Present Illness - depression     Characteristics/Recent psychiatric symptoms (pertinent positives and negatives) - reports increased depression for the past month as he reflected, wondering if with everything during the first 6 months of this year having flown past by him 'so quickly' with stress at work, home, family issues, deaths, 'that it finally caught up with me.' Admits the past 2 weekends he slept both  and Sundays all day 'which is highly uncharacteristic of me!' Reports sleep in general had returned in normal otherwise at 6-7 hrs nightly with afternoon naps lasting 30 min. To 2 hours resulting from a mixture of mental/physical fatigue post-work day but also strong anhedonia with 'just me coming home and not wanting to do anything.' Reports procrastination was higher than usual for him the past 3-4 weeks 'as budget season is something I actually like doing and for me to want to avoid doing it and not having the motivation to do it, was really off and odd for me. I could not even focus on it for most of the past several weeks. I ended up having to force myself to get things done.' Reports his relationship with his fiance is fine. Reports his grandmother is doing well for the past 2 months and at age 86 'is feisty'. Reports his mother however 'has gone down even more'. Reports with his brother being home, and helping their dad with helping supporting their mother, the discussion is coming up soon when the family will need to come to a decision of " 'how much is too much with supporting our mother, who for now is self-sufficient, she has some issues still and when are they going to ask for support. My dad is taking care of her, my grandmother and he is struggling with his own care and my brother is witnessing it all. How much stress can all of us take?' Reports his father's family is either ignoring offering help or clueless about the situation and his father is 'not quite there yet' with the possibility of putting their mother in memory assisted living care. Reports his fiance is still working at her current job (), but underpaid, overworked and needs to find a better job (All work is put on her as others don't want to do the work, so she takes it all on.) Denies concerns with anxiety as it has improved. Reports with current depression, even though the Vyvanse does work with managing his ADHD, his attention/concentration is impacted negatively. 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 It's a combination of snacking and boredom eating.' Reports only a small return of racing, obsessive, ruminating thoughts but no real intrusive thoughts but 'more so an uptick in a sense of imposter syndrome with mostly work, but also my transitioning. There is a sense that I need to occupy more space with my being transgender.' Reports he is talking to his therapist about this issue.     Onset/timeframe - 1 month  Type - depression  Duration - daily  Aggravating and/or relieving factors/triggers - feels life stressors over the past 6 months had finally caught up and overwhelmed him, bringing his mood down.  Treatment and treatment changes (new meds, dosage increases or decreases, med compliance, therapy frequency, etc.) (Past and Recent) - Lexapro 30mg QD,  Vyvanse 50mg QD, Trazodone 37.5mg @[facility]. Reports seeing Frances for therapy once every other Monday.     Issues: Denies SI/HI/AVH.     Excited that he and his fiance have their wedding plans set in motion for this Fall.           Review of Systems   Constitutional: Negative.    HENT: Negative.     Psychiatric/Behavioral:  Positive for dysphoric mood.        OARRS:  Yuval Chris, APRN-CNP on 7/6/2024  1:16 PM  I have personally reviewed the OARRS report for Sheng Cerda Isaac Turner. I have considered the risks of abuse, dependence, addiction and diversion    Is the patient prescribed a combination of a benzodiazepine and opioid?  No    Last Urine Drug Screen / ordered today: No  Recent Results (from the past 8760 hour(s))   Amphetamine Confirm, Urine    Collection Time: 07/29/23 11:33 AM   Result Value Ref Range    Amphetamines,Urine 808 ng/mL    MDA, Urine <200 ng/mL    MDEA, Urine <200 ng/mL    MDMA, Urine <200 ng/mL    Methamphetamine Quant, Ur <200 ng/mL    Phentermine,Urine <200 ng/mL   Drug Screen, Urine With Reflex to Confirmation    Collection Time: 07/29/23 11:33 AM   Result Value Ref Range    DRUG SCREEN COMMENT URINE SEE BELOW     Amphetamine Screen, Urine PRESUMPTIVE POSITIVE (A) NEGATIVE    Barbiturate Screen, Urine PRESUMPTIVE NEGATIVE NEGATIVE    BENZODIAZEPINE (PRESENCE) IN URINE BY SCREEN METHOD PRESUMPTIVE NEGATIVE NEGATIVE    Cannabinoid Screen, Urine PRESUMPTIVE NEGATIVE NEGATIVE    Cocaine Screen, Urine PRESUMPTIVE NEGATIVE NEGATIVE    Fentanyl, Ur PRESUMPTIVE NEGATIVE NEGATIVE    Methadone Screen, Urine PRESUMPTIVE NEGATIVE NEGATIVE    Opiate Screen, Urine PRESUMPTIVE NEGATIVE NEGATIVE    Oxycodone Screen, Ur PRESUMPTIVE NEGATIVE NEGATIVE    PCP Screen, Urine PRESUMPTIVE NEGATIVE NEGATIVE     Results are as expected.         Controlled Substance Agreement:  Date of the Last Agreement: 10/27/2023  Reviewed Controlled Substance Agreement including but not limited to the benefits, risks, and  alternatives to treatment with a Controlled Substance medication(s).    Stimulants:   What is the patient's goal of therapy? Stable attention/focus  Is this being achieved with current treatment? yes    Activities of Daily Living:   Is your overall impression that this patient is benefiting (symptom reduction outweighs side effects) from stimulant therapy? Yes     1. Physical Functioning: Better  2. Family Relationship: Same  3. Social Relationship: Same  4. Mood: Worse  5. Sleep Patterns: Better  6. Overall Function: Better      Objective   Mental Status Exam:  General Appearance: Fairly groomed  Attitude/Behavior: Cooperative, Distracted  Motor: No psychomotor agitation or retardation, no tremor or other abnormal movements  Speech: Rapid Speech, pressured  Gait/Station: Other:(comment) (sitting on outside deck of home, over virtual connection)  Mood: 'down but slowly getting better'  Affect: Constricted, Blunted, Congruent with mood and topic of conversation  Thought Process: Linear, goal directed (obsessive)  Thought Associations: No loosening of associations  Thought Content: Normal  Perception: No perceptual abnormalities noted  Sensorium: Alert and oriented to person, place, time and situation  Insight: Fair  Judgement: Intact  Cognition: Cognitively intact to conversational testing with respect to attention, orientation, fund of knowledge, recent and remote memory, and language  Testing: N/A    АЛЕКСАНДР-7/PHQ-9 scores reviewed: 5, 13 compared to 9, 3 reflecting improved anxiety yet a rebound in depression.    Current Medications:  Current Outpatient Medications on File Prior to Visit   Medication Sig Dispense Refill    alcohol swabs pads, medicated USE AS DIRECTED.      diclofenac sodium 1 % kit Apply 1 Application topically 3 times a day as needed (pain).      escitalopram (Lexapro) 20 mg tablet Take 1 tablet (20 mg) by mouth once daily. For seasonal depression 90 tablet 3    lisdexamfetamine (Vyvanse) 50 mg  "capsule Take 1 capsule (50 mg) by mouth once daily in the morning. Do not fill before June 30, 2024. 30 capsule 0    [START ON 7/30/2024] lisdexamfetamine (Vyvanse) 50 mg capsule Take 1 capsule (50 mg) by mouth once daily in the morning. Do not fill before July 30, 2024. 30 capsule 0    [START ON 8/29/2024] lisdexamfetamine (Vyvanse) 50 mg capsule Take 1 capsule (50 mg) by mouth once daily in the morning. Do not fill before August 29, 2024. 30 capsule 0    loratadine-pseudoephedrine (Claritin-D 24 Hour)  mg 24 hr tablet Take 1 tablet by mouth once daily. 30 tablet 2    magnesium oxide (Mag-Ox) 400 mg tablet Take 1 tablet (400 mg) by mouth once daily.      syringe with needle (Syringe 3cc/22Gx1\") 3 mL 22 gauge x 1\" syringe Inject 1 each into the shoulder, thigh, or buttocks every 14 (fourteen) days.      syringe with needle 12 mL 18 gauge x 1\" syringe 1 each by Does not apply route every 14 (fourteen) days. Use 18G needle to draw up Testosterone, 22G needle to inject.      testosterone cypionate (Depo-Testosterone) 200 mg/mL injection Inject 0.9ml every 2 weeks 10 mL 2    traZODone (Desyrel) 50 mg tablet Take 2 tablets (100 mg) by mouth once daily at bedtime. Can take 1/2 tablet (25mg) up to 100mg (2 tablets). (Patient taking differently: Take 1 tablet (50 mg) by mouth once daily at bedtime. Takes 37.5mg nightly.) 120 tablet 0    lisdexamfetamine (Vyvanse) 50 mg capsule Take 1 capsule (50 mg) by mouth once daily in the morning.      [DISCONTINUED] lisdexamfetamine (Vyvanse) 50 mg capsule Take 1 capsule (50 mg) by mouth once daily in the morning. Do not start before May 1, 2024. 30 capsule 0    [DISCONTINUED] lisdexamfetamine (Vyvanse) 50 mg capsule Take 1 capsule (50 mg) by mouth once daily in the morning. Do not start before May 31, 2024. 30 capsule 0     No current facility-administered medications on file prior to visit.       Lab Review:   Office Visit on 04/15/2024   Component Date Value    WBC 04/15/2024 " 7.1     nRBC 04/15/2024 0.0     RBC 04/15/2024 5.40     Hemoglobin 04/15/2024 13.8     Hematocrit 04/15/2024 43.7     MCV 04/15/2024 81     MCH 04/15/2024 25.6 (L)     MCHC 04/15/2024 31.6 (L)     RDW 04/15/2024 13.4     Platelets 04/15/2024 327     Testosterone 04/15/2024 1,122 (H)     Cholesterol 04/15/2024 160     HDL-Cholesterol 04/15/2024 36.7     Cholesterol/HDL Ratio 04/15/2024 4.4     LDL Calculated 04/15/2024 94     VLDL 04/15/2024 30     Triglycerides 04/15/2024 148     Non HDL Cholesterol 04/15/2024 123          Time Spent:    Prep time: 1 min.  Direct patient time: 29 min.  Documentation time: 8 min.  Total time: 38 min.    Next Appointment:  Follow up in about 6 weeks (around 8/16/2024).

## 2024-07-06 PROBLEM — F41.9 ANXIETY: Status: RESOLVED | Noted: 2023-05-18 | Resolved: 2024-07-06

## 2024-07-06 PROBLEM — F33.1 MODERATE EPISODE OF RECURRENT MAJOR DEPRESSIVE DISORDER (MULTI): Status: ACTIVE | Noted: 2024-07-06

## 2024-07-06 ASSESSMENT — ENCOUNTER SYMPTOMS: DYSPHORIC MOOD: 1

## 2024-07-17 ENCOUNTER — LAB (OUTPATIENT)
Dept: LAB | Facility: LAB | Age: 29
End: 2024-07-17
Payer: COMMERCIAL

## 2024-07-17 DIAGNOSIS — F64.9 GENDER DYSPHORIA: ICD-10-CM

## 2024-07-17 DIAGNOSIS — F90.0 ATTENTION DEFICIT HYPERACTIVITY DISORDER (ADHD), PREDOMINANTLY INATTENTIVE TYPE: ICD-10-CM

## 2024-07-17 LAB
ERYTHROCYTE [DISTWIDTH] IN BLOOD BY AUTOMATED COUNT: 13.5 % (ref 11.5–14.5)
HCT VFR BLD AUTO: 45.4 % (ref 36–52)
HGB BLD-MCNC: 13.9 G/DL (ref 12–17.5)
MCH RBC QN AUTO: 25.2 PG (ref 26–34)
MCHC RBC AUTO-ENTMCNC: 30.6 G/DL (ref 32–36)
MCV RBC AUTO: 82 FL (ref 80–100)
NRBC BLD-RTO: 0 /100 WBCS (ref 0–0)
PLATELET # BLD AUTO: 331 X10*3/UL (ref 150–450)
RBC # BLD AUTO: 5.51 X10*6/UL (ref 4–5.9)
TESTOST SERPL-MCNC: 717 NG/DL (ref 0–1000)
WBC # BLD AUTO: 7.7 X10*3/UL (ref 4.4–11.3)

## 2024-07-17 PROCEDURE — 84403 ASSAY OF TOTAL TESTOSTERONE: CPT

## 2024-07-17 PROCEDURE — 85027 COMPLETE CBC AUTOMATED: CPT

## 2024-07-17 PROCEDURE — 80324 DRUG SCREEN AMPHETAMINES 1/2: CPT

## 2024-07-17 PROCEDURE — 36415 COLL VENOUS BLD VENIPUNCTURE: CPT

## 2024-07-22 LAB
AMPHET UR-MCNC: >5000 NG/ML
MDA UR-MCNC: <200 NG/ML
MDEA UR-MCNC: <200 NG/ML
MDMA UR-MCNC: <200 NG/ML
METHAMPHET UR-MCNC: <200 NG/ML
PHENTERMINE UR CFM-MCNC: <200 NG/ML

## 2024-08-19 ENCOUNTER — APPOINTMENT (OUTPATIENT)
Dept: BEHAVIORAL HEALTH | Facility: CLINIC | Age: 29
End: 2024-08-19
Payer: COMMERCIAL

## 2024-08-19 ENCOUNTER — OFFICE VISIT (OUTPATIENT)
Dept: GASTROENTEROLOGY | Facility: CLINIC | Age: 29
End: 2024-08-19
Payer: COMMERCIAL

## 2024-08-19 VITALS — HEART RATE: 107 BPM | WEIGHT: 204 LBS | BODY MASS INDEX: 33.99 KG/M2 | HEIGHT: 65 IN

## 2024-08-19 DIAGNOSIS — F33.1 MODERATE EPISODE OF RECURRENT MAJOR DEPRESSIVE DISORDER (MULTI): ICD-10-CM

## 2024-08-19 DIAGNOSIS — F33.8 SEASONAL AFFECTIVE DISORDER (CMS-HCC): ICD-10-CM

## 2024-08-19 DIAGNOSIS — R14.0 BLOATING: ICD-10-CM

## 2024-08-19 DIAGNOSIS — F64.9 GENDER DYSPHORIA: ICD-10-CM

## 2024-08-19 DIAGNOSIS — K58.0 IRRITABLE BOWEL SYNDROME WITH DIARRHEA: Primary | ICD-10-CM

## 2024-08-19 DIAGNOSIS — F33.40 RECURRENT MAJOR DEPRESSIVE DISORDER, IN REMISSION (CMS-HCC): ICD-10-CM

## 2024-08-19 DIAGNOSIS — F90.0 ATTENTION DEFICIT HYPERACTIVITY DISORDER (ADHD), PREDOMINANTLY INATTENTIVE TYPE: Primary | ICD-10-CM

## 2024-08-19 DIAGNOSIS — F41.1 GAD (GENERALIZED ANXIETY DISORDER): ICD-10-CM

## 2024-08-19 PROBLEM — Z78.9 FEMALE-TO-MALE TRANSGENDER PERSON: Status: RESOLVED | Noted: 2023-05-18 | Resolved: 2024-08-19

## 2024-08-19 PROCEDURE — 1036F TOBACCO NON-USER: CPT | Performed by: INTERNAL MEDICINE

## 2024-08-19 PROCEDURE — 99214 OFFICE O/P EST MOD 30 MIN: CPT | Performed by: INTERNAL MEDICINE

## 2024-08-19 PROCEDURE — 3008F BODY MASS INDEX DOCD: CPT | Performed by: INTERNAL MEDICINE

## 2024-08-19 PROCEDURE — 1036F TOBACCO NON-USER: CPT | Performed by: NURSE PRACTITIONER

## 2024-08-19 PROCEDURE — 99214 OFFICE O/P EST MOD 30 MIN: CPT | Performed by: NURSE PRACTITIONER

## 2024-08-19 RX ORDER — LISDEXAMFETAMINE DIMESYLATE 50 MG/1
50 CAPSULE ORAL EVERY MORNING
Qty: 30 CAPSULE | Refills: 0 | Status: SHIPPED | OUTPATIENT
Start: 2024-10-28 | End: 2024-11-27

## 2024-08-19 RX ORDER — LISDEXAMFETAMINE DIMESYLATE 50 MG/1
50 CAPSULE ORAL EVERY MORNING
Qty: 30 CAPSULE | Refills: 0 | Status: SHIPPED | OUTPATIENT
Start: 2024-09-28 | End: 2024-10-28

## 2024-08-19 RX ORDER — BUPROPION HYDROCHLORIDE 150 MG/1
150 TABLET ORAL DAILY
Qty: 90 TABLET | Refills: 3 | Status: SHIPPED | OUTPATIENT
Start: 2024-09-03 | End: 2025-09-03

## 2024-08-19 RX ORDER — LISDEXAMFETAMINE DIMESYLATE 50 MG/1
50 CAPSULE ORAL EVERY MORNING
Qty: 30 CAPSULE | Refills: 0 | Status: SHIPPED | OUTPATIENT
Start: 2024-11-27 | End: 2024-12-27

## 2024-08-19 ASSESSMENT — ANXIETY QUESTIONNAIRES
4. TROUBLE RELAXING: SEVERAL DAYS
GAD7 TOTAL SCORE: 3
IF YOU CHECKED OFF ANY PROBLEMS ON THIS QUESTIONNAIRE, HOW DIFFICULT HAVE THESE PROBLEMS MADE IT FOR YOU TO DO YOUR WORK, TAKE CARE OF THINGS AT HOME, OR GET ALONG WITH OTHER PEOPLE: NOT DIFFICULT AT ALL
6. BECOMING EASILY ANNOYED OR IRRITABLE: SEVERAL DAYS
2. NOT BEING ABLE TO STOP OR CONTROL WORRYING: NOT AT ALL
5. BEING SO RESTLESS THAT IT IS HARD TO SIT STILL: NOT AT ALL
7. FEELING AFRAID AS IF SOMETHING AWFUL MIGHT HAPPEN: NOT AT ALL
1. FEELING NERVOUS, ANXIOUS, OR ON EDGE: SEVERAL DAYS
3. WORRYING TOO MUCH ABOUT DIFFERENT THINGS: NOT AT ALL

## 2024-08-19 ASSESSMENT — PATIENT HEALTH QUESTIONNAIRE - PHQ9
10. IF YOU CHECKED OFF ANY PROBLEMS, HOW DIFFICULT HAVE THESE PROBLEMS MADE IT FOR YOU TO DO YOUR WORK, TAKE CARE OF THINGS AT HOME, OR GET ALONG WITH OTHER PEOPLE: NOT DIFFICULT AT ALL
1. LITTLE INTEREST OR PLEASURE IN DOING THINGS: NOT AT ALL
6. FEELING BAD ABOUT YOURSELF - OR THAT YOU ARE A FAILURE OR HAVE LET YOURSELF OR YOUR FAMILY DOWN: NOT AT ALL
5. POOR APPETITE OR OVEREATING: NOT AT ALL
4. FEELING TIRED OR HAVING LITTLE ENERGY: SEVERAL DAYS
7. TROUBLE CONCENTRATING ON THINGS, SUCH AS READING THE NEWSPAPER OR WATCHING TELEVISION: NOT AT ALL
3. TROUBLE FALLING OR STAYING ASLEEP OR SLEEPING TOO MUCH: SEVERAL DAYS
8. MOVING OR SPEAKING SO SLOWLY THAT OTHER PEOPLE COULD HAVE NOTICED. OR THE OPPOSITE, BEING SO FIGETY OR RESTLESS THAT YOU HAVE BEEN MOVING AROUND A LOT MORE THAN USUAL: NOT AT ALL
2. FEELING DOWN, DEPRESSED OR HOPELESS: NOT AT ALL
9. THOUGHTS THAT YOU WOULD BE BETTER OFF DEAD, OR OF HURTING YOURSELF: NOT AT ALL

## 2024-08-19 ASSESSMENT — ENCOUNTER SYMPTOMS
PSYCHIATRIC NEGATIVE: 1
CONSTITUTIONAL NEGATIVE: 1
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
## 2024-08-19 NOTE — PROGRESS NOTES
REASON FOR VISIT: To discuss flare of IBS symptoms    HPI:  Sheng Prashant Turner is a 28 y.o. adult with a past medical history of thyroid none IBS being evaluated in the office for recent recurrence of diarrhea and bloating symptoms.  In the past seen about 3 years ago.  Time treated with Xifaxan and had good symptom response.  Reports recent bloating and intermittent diarrhea symptoms.  No rectal bleeding.  No Phemister inflammatory bowel disease.  Grandfather has colon cancer.  Does not follow any particular diet at this time.  No drinking of milk.  Does occasionally eat ice cream.          PRIOR ENDOSCOPY    PAST MEDICAL HISTORY  Past Medical History:   Diagnosis Date    Concussion 05/18/2023       PAST SURGICAL HISTORY  Past Surgical History:   Procedure Laterality Date    OTHER SURGICAL HISTORY  06/18/2021    Stokes tooth extraction    OTHER SURGICAL HISTORY  12/03/2021    Mastectomy bilateral    OTHER SURGICAL HISTORY  12/03/2021    Total hysterectomy laparoscopic    OTHER SURGICAL HISTORY  12/03/2021    Salpingo-oophorectomy bilateral    OTHER SURGICAL HISTORY  12/03/2021    Cystoscopy       FAMILY HISTORY  Family History   Problem Relation Name Age of Onset    Other (early onset alzheimer) Mother      Hypertension Mother      Epilepsy Father      Lung cancer Maternal Grandfather      Other (cardiac disorder) Paternal Grandfather      Diabetes Paternal Grandfather      Lung cancer Paternal Grandfather      Colon cancer Paternal Grandfather      Breast cancer Other paternal relatives        SOCIAL HISTORY  Social History     Tobacco Use    Smoking status: Never    Smokeless tobacco: Never   Substance Use Topics    Alcohol use: Yes       REVIEW OF SYSTEMS  CONSTITUTIONAL: negative for fever, chills, fatigue, or unintentional weight loss,   HEENT: negative for icteric sclera, eye pain/redness, or changes in vision/hearing  RESPIRATORY: negative for cough, hemoptysis, wheezing, orthopnea, or dyspnea on  "exertion  CARDIOVASCULAR: negative for chest pain, palpitations, or syncope   GASTROINTESTINAL: as noted per HPI  GENITOURINARY: negative for dysuria, polyuria, incontinence, or hematuria  MUSCULOSKELETAL: negative for arthralgia, myalgia, or joint swelling/stiffness   INTEGUMENTARY/SKIN: negative jaundice, rash, or skin lesion  HEMATOLOGIC/LYMPHATIC: negative for prolonged bleeding, easy bruising, or swollen lymph nodes  ENDOCRINE: negative for cold/heat intolerance, polydipsia, polyuria, or goiter  NEUROLOGIC: negative for headaches, dizziness, tremor, or gait abnormality  PSYCHIATRIC: negative for anxiety, depression, personality changes, or sleep disturbances      A 10 point review of systems was completed and was otherwise negative.    ALLERGIES  Allergies   Allergen Reactions    Penicillins Other     Hot flashes and chills       MEDICATIONS  Current Outpatient Medications   Medication Sig Dispense Refill    alcohol swabs pads, medicated USE AS DIRECTED.      [START ON 9/3/2024] buPROPion XL (Wellbutrin XL) 150 mg 24 hr tablet Take 1 tablet (150 mg) by mouth once daily. Do not crush, chew, or split. Do not fill before September 3, 2024. 90 tablet 3    diclofenac sodium 1 % kit Apply 1 Application topically 3 times a day as needed (pain).      escitalopram (Lexapro) 20 mg tablet Take 1 tablet (20 mg) by mouth once daily. For seasonal depression 90 tablet 3    [START ON 8/29/2024] lisdexamfetamine (Vyvanse) 50 mg capsule Take 1 capsule (50 mg) by mouth once daily in the morning. Do not fill before August 29, 2024. 30 capsule 0    loratadine-pseudoephedrine (Claritin-D 24 Hour)  mg 24 hr tablet Take 1 tablet by mouth once daily. 30 tablet 2    magnesium oxide (Mag-Ox) 400 mg tablet Take 1 tablet (400 mg) by mouth once daily.      syringe with needle (Syringe 3cc/22Gx1\") 3 mL 22 gauge x 1\" syringe Inject 1 each into the shoulder, thigh, or buttocks every 14 (fourteen) days.      syringe with needle 12 mL 18 " "gauge x 1\" syringe 1 each by Does not apply route every 14 (fourteen) days. Use 18G needle to draw up Testosterone, 22G needle to inject.      testosterone cypionate (Depo-Testosterone) 200 mg/mL injection Inject 0.9ml every 2 weeks 10 mL 2    traZODone (Desyrel) 50 mg tablet Take 2 tablets (100 mg) by mouth once daily at bedtime. Can take 1/2 tablet (25mg) up to 100mg (2 tablets). (Patient taking differently: Take 1 tablet (50 mg) by mouth once daily at bedtime. Takes 37.5mg nightly.) 120 tablet 0    lisdexamfetamine (Vyvanse) 50 mg capsule Take 1 capsule (50 mg) by mouth once daily in the morning. Do not fill before July 30, 2024. 30 capsule 0    rifAXIMin (Xifaxan) 550 mg tablet Take 1 tablet (550 mg) by mouth 3 times a day for 28 days. 42 tablet 1     No current facility-administered medications for this visit.       VITALS  Pulse 107   Ht 1.651 m (5' 5\")   Wt 92.5 kg (204 lb)   BMI 33.95 kg/m²      PHYSICAL EXAM  Alert and oriented in no acute distress    ASSESSMENT/ PLAN  Patient with diarrhea abdominal IBS with recent flare of symptoms with recent diarrhea and abdominal bloating symptoms.  In the past had good response with Xifaxan for therapy.  Currently on multiple antidepressants and we will hold off on adding tricyclic antidepressants.  Will retreat with Xifaxan 550 mg 3 times a day for 14 days.  I asked him to try a low FODMAP diet.  He is in agreement with the plan.  He will see me back in the office as needed.        Signature: Piedad Pabon MD    Date: 8/19/2024  Time: 2:17 PM    "

## 2024-08-22 ENCOUNTER — TELEPHONE (OUTPATIENT)
Dept: ALLERGY | Facility: CLINIC | Age: 29
End: 2024-08-22

## 2024-08-27 ENCOUNTER — APPOINTMENT (OUTPATIENT)
Dept: ALLERGY | Facility: CLINIC | Age: 29
End: 2024-08-27
Payer: COMMERCIAL

## 2024-08-27 DIAGNOSIS — L50.1 CHRONIC IDIOPATHIC URTICARIA: ICD-10-CM

## 2024-08-27 DIAGNOSIS — J31.0 CHRONIC RHINITIS: ICD-10-CM

## 2024-08-27 DIAGNOSIS — L50.0 ALLERGIC URTICARIA: Primary | ICD-10-CM

## 2024-08-27 PROCEDURE — 95004 PERQ TESTS W/ALRGNC XTRCS: CPT | Performed by: ALLERGY & IMMUNOLOGY

## 2024-08-27 PROCEDURE — 99204 OFFICE O/P NEW MOD 45 MIN: CPT | Performed by: ALLERGY & IMMUNOLOGY

## 2024-08-27 RX ORDER — CETIRIZINE HYDROCHLORIDE 10 MG/1
10 TABLET ORAL DAILY PRN
Qty: 30 TABLET | Refills: 11 | Status: SHIPPED | OUTPATIENT
Start: 2024-08-27 | End: 2025-08-27

## 2024-08-27 NOTE — PROGRESS NOTES
Subjective   Patient ID:   [id number]   Sheng Turner is a 28 y.o. adult who presents for No chief complaint on file..    No chief complaint on file.         HPI  This patient is here to evaluate for:  Hives - first time he noticed it was about 10 years ago.   Approx 1x/month.     Triggers: gluten, cabbage made by his in-laws one summer.  Othertimes he is not aware.     The patient reports onset of urticaria beginning:  arms, back  Characteristics: erythematous, pruritic, raised  Size: various.   Location:   Duration of individual hives: about a day  Burning:  no  Bruising: no     Medications that are taken for hives:  Loratadine D  helps with sneezing and congestion.     The patient states urticaria has worsened with exposure to:   New medications: no; denies worsening of hives after taking current medications  (see chart)  Infection: no  Hot or cold temps:  no  Pressure: no  Exercise after eating: no  Exercise: no  Use of ASA, NSAIDS, or alcohol: not related to these.   Particular foods: as above     Have you had facial swelling, tongue swelling, throat swelling, trouble swallowing, abdominal cramping, cough, wheeze, or trouble breathing:   No    Also has Irritable bowel syndrome  Worse with gluten     Symptoms of rhinitis:    He reports redness, itching, and swelling of the eyes, pressure of the ears, sneezing, congestion, rhinorrhea, snoring, postnasal drainage, and headaches.  Nasal and eye symptoms also triggered by strong perfumes.   Or hot and dry.     Past medical history/past surgical history -hysterectomy and breast surgery November 2021    Review of Systems   All other systems reviewed and are negative.        Objective     There were no vitals taken for this visit.     Physical Exam  Constitutional:       General: He is not in acute distress.     Appearance: Normal appearance. He is not ill-appearing.   HENT:      Head: Normocephalic and atraumatic.      Right Ear: Tympanic membrane, ear  canal and external ear normal.      Left Ear: Tympanic membrane, ear canal and external ear normal.      Nose: Nose normal. No congestion or rhinorrhea.      Mouth/Throat:      Mouth: Mucous membranes are moist.      Pharynx: Oropharynx is clear. No oropharyngeal exudate or posterior oropharyngeal erythema.   Eyes:      General:         Right eye: No discharge.         Left eye: No discharge.      Conjunctiva/sclera: Conjunctivae normal.   Cardiovascular:      Rate and Rhythm: Normal rate and regular rhythm.      Heart sounds: Normal heart sounds. No murmur heard.     No friction rub. No gallop.   Pulmonary:      Effort: Pulmonary effort is normal. No respiratory distress.      Breath sounds: Normal breath sounds. No stridor. No wheezing, rhonchi or rales.   Chest:      Chest wall: No tenderness.   Abdominal:      General: Abdomen is flat.      Palpations: Abdomen is soft.   Musculoskeletal:         General: Normal range of motion.      Cervical back: Normal range of motion and neck supple.   Lymphadenopathy:      Cervical: No cervical adenopathy.   Skin:     General: Skin is warm and dry.      Findings: No erythema, lesion or rash.   Neurological:      General: No focal deficit present.      Mental Status: He is alert. Mental status is at baseline.   Psychiatric:         Mood and Affect: Mood normal.         Behavior: Behavior normal.         Thought Content: Thought content normal.         Judgment: Judgment normal.            Current Outpatient Medications   Medication Sig Dispense Refill    alcohol swabs pads, medicated USE AS DIRECTED.      [START ON 9/3/2024] buPROPion XL (Wellbutrin XL) 150 mg 24 hr tablet Take 1 tablet (150 mg) by mouth once daily. Do not crush, chew, or split. Do not fill before September 3, 2024. 90 tablet 3    diclofenac sodium 1 % kit Apply 1 Application topically 3 times a day as needed (pain).      escitalopram (Lexapro) 20 mg tablet Take 1 tablet (20 mg) by mouth once daily. For  "seasonal depression 90 tablet 3    lisdexamfetamine (Vyvanse) 50 mg capsule Take 1 capsule (50 mg) by mouth once daily in the morning. Do not fill before July 30, 2024. 30 capsule 0    [START ON 8/29/2024] lisdexamfetamine (Vyvanse) 50 mg capsule Take 1 capsule (50 mg) by mouth once daily in the morning. Do not fill before August 29, 2024. 30 capsule 0    [START ON 9/28/2024] lisdexamfetamine (Vyvanse) 50 mg capsule Take 1 capsule (50 mg) by mouth once daily in the morning. Do not fill before September 28, 2024. 30 capsule 0    [START ON 10/28/2024] lisdexamfetamine (Vyvanse) 50 mg capsule Take 1 capsule (50 mg) by mouth once daily in the morning. Do not fill before October 28, 2024. 30 capsule 0    [START ON 11/27/2024] lisdexamfetamine (Vyvanse) 50 mg capsule Take 1 capsule (50 mg) by mouth once daily in the morning. Do not fill before November 27, 2024. 30 capsule 0    loratadine-pseudoephedrine (Claritin-D 24 Hour)  mg 24 hr tablet Take 1 tablet by mouth once daily. 30 tablet 2    magnesium oxide (Mag-Ox) 400 mg tablet Take 1 tablet (400 mg) by mouth once daily.      rifAXIMin (Xifaxan) 550 mg tablet Take 1 tablet (550 mg) by mouth 3 times a day for 28 days. 42 tablet 1    syringe with needle (Syringe 3cc/22Gx1\") 3 mL 22 gauge x 1\" syringe Inject 1 each into the shoulder, thigh, or buttocks every 14 (fourteen) days.      syringe with needle 12 mL 18 gauge x 1\" syringe 1 each by Does not apply route every 14 (fourteen) days. Use 18G needle to draw up Testosterone, 22G needle to inject.      testosterone cypionate (Depo-Testosterone) 200 mg/mL injection Inject 0.9ml every 2 weeks 10 mL 2    traZODone (Desyrel) 50 mg tablet Take 2 tablets (100 mg) by mouth once daily at bedtime. Can take 1/2 tablet (25mg) up to 100mg (2 tablets). (Patient taking differently: Take 1 tablet (50 mg) by mouth once daily at bedtime. Takes 37.5mg nightly.) 120 tablet 0     No current facility-administered medications for this " visit.       Summary of the labs over the past 6 months:    Lab on 07/17/2024   Component Date Value Ref Range Status    WBC 07/17/2024 7.7  4.4 - 11.3 x10*3/uL Final    nRBC 07/17/2024 0.0  0.0 - 0.0 /100 WBCs Final    RBC 07/17/2024 5.51  4.00 - 5.90 x10*6/uL Final    Hemoglobin 07/17/2024 13.9  12.0 - 17.5 g/dL Final    Hematocrit 07/17/2024 45.4  36.0 - 52.0 % Final    MCV 07/17/2024 82  80 - 100 fL Final    MCH 07/17/2024 25.2 (L)  26.0 - 34.0 pg Final    MCHC 07/17/2024 30.6 (L)  32.0 - 36.0 g/dL Final    RDW 07/17/2024 13.5  11.5 - 14.5 % Final    Platelets 07/17/2024 331  150 - 450 x10*3/uL Final    Testosterone 07/17/2024 717  0 - 1,000 ng/dL Final    Methamphetamine Quant, Ur 07/17/2024 <200  ng/mL Final    MDA, Urine 07/17/2024 <200  ng/mL Final    MDEA, Urine 07/17/2024 <200  ng/mL Final    Phentermine,Urine 07/17/2024 <200  ng/mL Final    Amphetamines,Urine 07/17/2024 >5000  ng/mL Final    MDMA, Urine 07/17/2024 <200  ng/mL Final   Office Visit on 04/15/2024   Component Date Value Ref Range Status    WBC 04/15/2024 7.1  4.4 - 11.3 x10*3/uL Final    nRBC 04/15/2024 0.0  0.0 - 0.0 /100 WBCs Final    RBC 04/15/2024 5.40  4.00 - 5.90 x10*6/uL Final    Hemoglobin 04/15/2024 13.8  12.0 - 17.5 g/dL Final    Hematocrit 04/15/2024 43.7  36.0 - 52.0 % Final    MCV 04/15/2024 81  80 - 100 fL Final    MCH 04/15/2024 25.6 (L)  26.0 - 34.0 pg Final    MCHC 04/15/2024 31.6 (L)  32.0 - 36.0 g/dL Final    RDW 04/15/2024 13.4  11.5 - 14.5 % Final    Platelets 04/15/2024 327  150 - 450 x10*3/uL Final    Testosterone 04/15/2024 1,122 (H)  0 - 1,000 ng/dL Final    Cholesterol 04/15/2024 160  0 - 199 mg/dL Final    HDL-Cholesterol 04/15/2024 36.7  mg/dL Final    Cholesterol/HDL Ratio 04/15/2024 4.4   Final    LDL Calculated 04/15/2024 94  <=99 mg/dL Final    VLDL 04/15/2024 30  0 - 40 mg/dL Final    Triglycerides 04/15/2024 148  0 - 149 mg/dL Final    Non HDL Cholesterol 04/15/2024 123  0 - 149 mg/dL Final     SCRATCH SKIN  TESTING:  No allergies to dust mites, molds, pollens, cats, or dogs.   FOOD SCRATCH SKIN TESTING:  No allergies to EGG, MILK, SOY, WHEAT, CODFISH, SHRIMP, WALNUT, AND PEANUT.  Negative to grain panel    Assessment/Plan   Diagnoses and all orders for this visit:  Allergic urticaria  -     cetirizine (ZyrTEC) 10 mg tablet; Take 1 tablet (10 mg) by mouth once daily as needed for allergies.  Allergic rhinitis, unspecified seasonality, unspecified trigger  -     Referral to Allergy  -     cetirizine (ZyrTEC) 10 mg tablet; Take 1 tablet (10 mg) by mouth once daily as needed for allergies.  Chronic idiopathic urticaria  -     cetirizine (ZyrTEC) 10 mg tablet; Take 1 tablet (10 mg) by mouth once daily as needed for allergies.    It was a pleasure to meet you and we are happy to welcome you to our office.     We performed allergy testing to help determine the etiology of your symptoms. We discussed the results of the testing. Also, we started to focus on treating your problem and we reviewed the management plan.    The allergy testing was negative to common environmental allergy triggers. This means that IgE-mediated allergy is not causing your symptoms.     You do not have any allergies and there are no red flags of concern and fortunately no additional testing is needed.     I recommended continuing the Claritin-D for the symptoms of rhinitis and ocular allergy.  And in addition to take cetirizine to treat the hives.  This can be done either daily as a prevention measure or on an as-needed basis.    I would be happy to see you again as needed. Please feel free to contact my office to schedule a follow-up with our office at [phone].          Edu Latham MD

## 2024-09-12 ENCOUNTER — APPOINTMENT (OUTPATIENT)
Dept: GASTROENTEROLOGY | Facility: CLINIC | Age: 29
End: 2024-09-12
Payer: COMMERCIAL

## 2024-10-16 ENCOUNTER — OFFICE VISIT (OUTPATIENT)
Dept: PRIMARY CARE | Facility: CLINIC | Age: 29
End: 2024-10-16
Payer: COMMERCIAL

## 2024-10-16 VITALS
HEIGHT: 65 IN | OXYGEN SATURATION: 99 % | RESPIRATION RATE: 14 BRPM | DIASTOLIC BLOOD PRESSURE: 87 MMHG | BODY MASS INDEX: 35.32 KG/M2 | SYSTOLIC BLOOD PRESSURE: 125 MMHG | HEART RATE: 104 BPM | TEMPERATURE: 96.5 F | WEIGHT: 212 LBS

## 2024-10-16 DIAGNOSIS — F64.9 GENDER DYSPHORIA: Primary | ICD-10-CM

## 2024-10-16 DIAGNOSIS — J30.9 ALLERGIC RHINITIS, UNSPECIFIED SEASONALITY, UNSPECIFIED TRIGGER: ICD-10-CM

## 2024-10-16 DIAGNOSIS — F41.9 ANXIETY: ICD-10-CM

## 2024-10-16 LAB
ALBUMIN SERPL BCP-MCNC: 4.7 G/DL (ref 3.4–5)
ALP SERPL-CCNC: 79 U/L (ref 33–120)
ALT SERPL W P-5'-P-CCNC: 21 U/L (ref 7–52)
ANION GAP SERPL CALC-SCNC: 13 MMOL/L (ref 10–20)
AST SERPL W P-5'-P-CCNC: 19 U/L (ref 9–39)
BILIRUB SERPL-MCNC: 0.5 MG/DL (ref 0–1.2)
BUN SERPL-MCNC: 12 MG/DL (ref 6–23)
CALCIUM SERPL-MCNC: 9.3 MG/DL (ref 8.6–10.6)
CHLORIDE SERPL-SCNC: 104 MMOL/L (ref 98–107)
CO2 SERPL-SCNC: 27 MMOL/L (ref 21–32)
CREAT SERPL-MCNC: 0.87 MG/DL (ref 0.5–1.3)
EGFRCR SERPLBLD CKD-EPI 2021: >90 ML/MIN/1.73M*2
ERYTHROCYTE [DISTWIDTH] IN BLOOD BY AUTOMATED COUNT: 13.3 % (ref 11.5–14.5)
GLUCOSE SERPL-MCNC: 75 MG/DL (ref 74–99)
HCT VFR BLD AUTO: 45.9 % (ref 36–52)
HGB BLD-MCNC: 14.2 G/DL (ref 12–17.5)
MCH RBC QN AUTO: 25.5 PG (ref 26–34)
MCHC RBC AUTO-ENTMCNC: 30.9 G/DL (ref 32–36)
MCV RBC AUTO: 82 FL (ref 80–100)
NRBC BLD-RTO: 0 /100 WBCS (ref 0–0)
PLATELET # BLD AUTO: 364 X10*3/UL (ref 150–450)
POTASSIUM SERPL-SCNC: 4.2 MMOL/L (ref 3.5–5.3)
PROT SERPL-MCNC: 7.1 G/DL (ref 6.4–8.2)
RBC # BLD AUTO: 5.57 X10*6/UL (ref 4–5.9)
SODIUM SERPL-SCNC: 140 MMOL/L (ref 136–145)
TESTOST SERPL-MCNC: 415 NG/DL (ref 0–1000)
WBC # BLD AUTO: 6.8 X10*3/UL (ref 4.4–11.3)

## 2024-10-16 PROCEDURE — 36415 COLL VENOUS BLD VENIPUNCTURE: CPT | Performed by: FAMILY MEDICINE

## 2024-10-16 PROCEDURE — 84403 ASSAY OF TOTAL TESTOSTERONE: CPT | Performed by: FAMILY MEDICINE

## 2024-10-16 PROCEDURE — 85027 COMPLETE CBC AUTOMATED: CPT | Performed by: FAMILY MEDICINE

## 2024-10-16 PROCEDURE — 3008F BODY MASS INDEX DOCD: CPT | Performed by: FAMILY MEDICINE

## 2024-10-16 PROCEDURE — 99214 OFFICE O/P EST MOD 30 MIN: CPT | Performed by: FAMILY MEDICINE

## 2024-10-16 PROCEDURE — 1036F TOBACCO NON-USER: CPT | Performed by: FAMILY MEDICINE

## 2024-10-16 PROCEDURE — 84075 ASSAY ALKALINE PHOSPHATASE: CPT | Performed by: FAMILY MEDICINE

## 2024-10-16 RX ORDER — LORATADINE PSEUDOEPHEDRINE SULFATE 10; 240 MG/1; MG/1
1 TABLET, EXTENDED RELEASE ORAL DAILY
Qty: 30 TABLET | Refills: 2 | Status: SHIPPED | OUTPATIENT
Start: 2024-10-16

## 2024-10-16 ASSESSMENT — ENCOUNTER SYMPTOMS
LOSS OF SENSATION IN FEET: 0
DEPRESSION: 0
OCCASIONAL FEELINGS OF UNSTEADINESS: 0

## 2024-10-16 ASSESSMENT — PATIENT HEALTH QUESTIONNAIRE - PHQ9
2. FEELING DOWN, DEPRESSED OR HOPELESS: NOT AT ALL
SUM OF ALL RESPONSES TO PHQ9 QUESTIONS 1 AND 2: 0
1. LITTLE INTEREST OR PLEASURE IN DOING THINGS: NOT AT ALL

## 2024-10-16 ASSESSMENT — PAIN SCALES - GENERAL: PAINLEVEL_OUTOF10: 0-NO PAIN

## 2024-10-16 NOTE — PROGRESS NOTES
"Subjective   Patient ID: Sheng Turner is a 28 y.o. adult male who presents for Follow-up and Med Refill.    #gender affirming care  Doing well, satisfied with current testosterone regimen. Injection due Sunday(every 2 week injections)    #MDD/ADHD  Multiple recent life stressors (death in family, illnesses in family) but coping appropriately. Sees therapist weekly & follows with psychiatry; is satisfied with care.   Medications reviewed in chart    #IBS  - Saw GI, had course of xifaxan and symptoms improved     #Health screening  No new sexual partners in last 3 months    Med Refill      10 point review of systems negative except as noted in HPI.      Objective   /87   Pulse 104   Temp 35.8 °C (96.5 °F)   Resp 14   Ht 1.651 m (5' 5\")   Wt 96.2 kg (212 lb)   SpO2 99%   BMI 35.28 kg/m²     General: well appearing, no distress  Neck: No lymphadenopathy, no thyromegaly  CV: Regular rate and rhythm, no murmur  Lungs: Clear to auscultation bilaterally  Abdomen: Soft, nontender, nondistended  Extremities: No edema noted  Psych: Appropriate mood and affect    Assessment/Plan   28M here for follow-up    #gender affirming care - stable  - Continue current testosterone regimen  - Labs as ordered     #MDD/ADHD - stable  - Continue current regimen    #IBS  - Well controlled, monitor     Plans for flu and covid vaccines at pharmacy   RTC 6 months or sooner as needed     "

## 2024-10-18 DIAGNOSIS — F64.9 GENDER DYSPHORIA: ICD-10-CM

## 2024-10-18 RX ORDER — TESTOSTERONE CYPIONATE 200 MG/ML
INJECTION, SOLUTION INTRAMUSCULAR
Qty: 10 ML | Refills: 2 | Status: SHIPPED | OUTPATIENT
Start: 2024-10-18

## 2024-11-07 NOTE — PROGRESS NOTES
Medical certificate Letter written for patient for his trip to [facility].    Signed by collaborating physician.

## 2024-11-18 ENCOUNTER — APPOINTMENT (OUTPATIENT)
Dept: BEHAVIORAL HEALTH | Facility: CLINIC | Age: 29
End: 2024-11-18
Payer: COMMERCIAL

## 2024-11-18 DIAGNOSIS — F41.1 GAD (GENERALIZED ANXIETY DISORDER): ICD-10-CM

## 2024-11-18 DIAGNOSIS — F33.8 SEASONAL AFFECTIVE DISORDER (CMS-HCC): ICD-10-CM

## 2024-11-18 DIAGNOSIS — F64.9 GENDER DYSPHORIA: ICD-10-CM

## 2024-11-18 DIAGNOSIS — F90.0 ATTENTION DEFICIT HYPERACTIVITY DISORDER (ADHD), PREDOMINANTLY INATTENTIVE TYPE: Primary | ICD-10-CM

## 2024-11-18 DIAGNOSIS — F33.40 RECURRENT MAJOR DEPRESSIVE DISORDER, IN REMISSION (CMS-HCC): ICD-10-CM

## 2024-11-18 PROCEDURE — 99214 OFFICE O/P EST MOD 30 MIN: CPT | Performed by: NURSE PRACTITIONER

## 2024-11-18 RX ORDER — LISDEXAMFETAMINE DIMESYLATE 50 MG/1
50 CAPSULE ORAL EVERY MORNING
Qty: 30 CAPSULE | Refills: 0 | Status: SHIPPED | OUTPATIENT
Start: 2025-01-26 | End: 2025-02-25

## 2024-11-18 RX ORDER — LISDEXAMFETAMINE DIMESYLATE 50 MG/1
50 CAPSULE ORAL EVERY MORNING
Qty: 30 CAPSULE | Refills: 0 | Status: SHIPPED | OUTPATIENT
Start: 2024-12-27 | End: 2025-01-26

## 2024-11-18 RX ORDER — LISDEXAMFETAMINE DIMESYLATE 50 MG/1
50 CAPSULE ORAL EVERY MORNING
Qty: 30 CAPSULE | Refills: 0 | Status: SHIPPED | OUTPATIENT
Start: 2025-02-25 | End: 2025-03-27

## 2024-11-18 NOTE — PROGRESS NOTES
"Adult Ambulatory Psychiatry Progress Note      Assessment/Plan     Impression:  Sheng Turner is a 29 y.o. transgender male domiciled , employed as  who presents for follow up with CC of \"Had a great time in [place]. Things were clean, people were so nice, I wish I was back there now. It was fun. We left the morning after the election. We will deal with that after we get back. We will need to get our name change on our marriage certificate done and have my name updated, and the name on the deed to the house to be changed. The outcome is so stupid.\"    Plan: Patient was cooperative and engaging but very tired as he reports he and his wife had just arrived from visiting [facility] this morning and had a wonderful time, while the outcome of the election (found out the morning that they were leaving for [facility]) has been causing some anxiety to spike. Overall report and presents himself to be stable, including no sign of seasonal depression - yet. Reviewed and agreed to leaving medications and treatment plan in place and can push out followup appt.    Medication: Wellbutrin XL 150mg every day. Lexapro 20mg every day, Vyvanse 50mg every day. Trazodone 37.5mg at bedtime as needed for sleep aid.       Reviewed r/b/a, possible side effects of the medication. Client is aware about the benefit outweighs the risk.     Diagnoses and all orders for this visit:  Attention deficit hyperactivity disorder (ADHD), predominantly inattentive type  -     lisdexamfetamine (Vyvanse) 50 mg capsule; Take 1 capsule (50 mg) by mouth once daily in the morning. Do not fill before December 27, 2024.  -     lisdexamfetamine (Vyvanse) 50 mg capsule; Take 1 capsule (50 mg) by mouth once daily in the morning. Do not fill before January 26, 2025.  -     lisdexamfetamine (Vyvanse) 50 mg capsule; Take 1 capsule (50 mg) by mouth once daily in the morning. Do not fill before February 25, 2025.  Gender dysphoria  Recurrent major " depressive disorder, in remission (CMS-HCC)  АЛЕКСАНДР (generalized anxiety disorder)  Seasonal affective disorder (CMS-[facility])          Therapy: none    Other: n/a    Patient is reminded that if there is SI to call [phone] and get themselves to the closest ED for evaluation, otherwise contact me for other questions/concerns.     Subjective   HPI:    Virtual Consent    An interactive audio and video telecommunication system which permits real time communications between the patient (at home) and provider (at home office) was utilized to provide this telehealth service.   Verbal consent was requested and obtained from Sheng Turner on this date, 24 for a telehealth visit.     Present Illness - anxiety     Characteristics/Recent psychiatric symptoms (pertinent positives and negatives) - reports having had a great time in [place] for 10 days but this happened right after the election and had to put his feeling about the results on hold, 'while the worries about that outcome was always lingering in the background'. Anxiety is heightened but manageable. Reports his wife's brother  unexpectedly, after suffering from an aneurysm over Labor Day weekend. Reports he stayed by her side through the process, as it was her and her siblings and her brother's ex wife all together, and it ended up his daughter being the one to make decisions for his end of life care. Reports his wedding went well and had a great time. Reports depression is stable. Reports ADHD is stable on Vyvanse. Reports energy levels and mental/physical fatigue are stable. Reports sleep is 'good' and averages 6-7 solid sleep every night, outside of the time changes with flying to/from [place] with the 14 hour time change. Work is good and stress is manageable. Reports continued improvement in racing, obsessive, ruminating thoughts outside of reflecting on the election results. Has not noticed any seasonal depression 'as of yet'.      Onset/timeframe - 2  weeks  Type - anxiety  Duration - situational  Aggravating and/or relieving factors/triggers - outcome of election but trip to [facility] was helping buffer the anxiety  Treatment and treatment changes (new meds, dosage increases or decreases, med compliance, therapy frequency, etc.) (Past and Recent) - Wellbutrin XL 150mg QD, Lexapro 20mg QD, Vyvanse 50mg QD, Trazodone 37.5mg @[facility]. Reports seeing Frances for therapy once every other Monday.     Issues: Denies SI/HI/AVH.          Review of Systems   Psychiatric/Behavioral:  The patient is nervous/anxious.    All other systems reviewed and are negative.      OARRS:  Yuval Chris, APRN-CNP on 11/20/2024  7:28 PM  I have personally reviewed the OARRS report for Sheng Turner. I have considered the risks of abuse, dependence, addiction and diversion    Is the patient prescribed a combination of a benzodiazepine and opioid?  No    Last Urine Drug Screen / ordered today: No  Recent Results (from the past 8760 hours)   Amphetamine Confirm, Urine    Collection Time: 07/17/24  1:59 PM   Result Value Ref Range    Methamphetamine Quant, Ur <200 ng/mL    MDA, Urine <200 ng/mL    MDEA, Urine <200 ng/mL    Phentermine,Urine <200 ng/mL    Amphetamines,Urine >5000 ng/mL    MDMA, Urine <200 ng/mL     Results are as expected.         Controlled Substance Agreement:  Date of the Last Agreement: 10/27/2023, signed 03/22/2024  Reviewed Controlled Substance Agreement including but not limited to the benefits, risks, and alternatives to treatment with a Controlled Substance medication(s).    Stimulants:   What is the patient's goal of therapy? Stable attention/focus  Is this being achieved with current treatment? yes    Activities of Daily Living:   Is your overall impression that this patient is benefiting (symptom reduction outweighs side effects) from stimulant therapy? Yes     1. Physical Functioning: Better  2. Family Relationship: Same  3. Social Relationship: Same  4.  Mood: Better  5. Sleep Patterns: Better  6. Overall Function: Better      Objective   Mental Status Exam:  General Appearance: Well groomed, appropriate eye contact  Attitude/Behavior: Cooperative, Other: (comment) (tired after 14 hour straight flight back from [facility])  Motor: Fidgeting  Speech: Normal rate, volume, prosody, Other: (comment) (perseverative)  Gait/Station: Other:(comment) (sitting in closet at home, over virtual connection)  Mood: 'ok but anxious'  Affect: Euthymic, full-range, Anxious, Congruent with mood and topic of conversation  Thought Process: Linear, goal directed, Other: (comment) (ruminating)  Thought Associations: No loosening of associations  Thought Content: Normal  Perception: No perceptual abnormalities noted  Sensorium: Alert and oriented to person, place, time and situation  Insight: Intact  Judgement: Intact  Cognition: Cognitively intact to conversational testing with respect to attention, orientation, fund of knowledge, recent and remote memory, and language  Testing: N/A    АЛЕКСАНДР-7/PHQ-9 scores reviewed: 3, 2 compared to 5, 13 reflecting improved anxiety and depression.    Current Medications:  Current Outpatient Medications on File Prior to Visit   Medication Sig Dispense Refill    alcohol swabs pads, medicated USE AS DIRECTED.      buPROPion XL (Wellbutrin XL) 150 mg 24 hr tablet Take 1 tablet (150 mg) by mouth once daily. Do not crush, chew, or split. Do not fill before September 3, 2024. 90 tablet 3    cetirizine (ZyrTEC) 10 mg tablet Take 1 tablet (10 mg) by mouth once daily as needed for allergies. 30 tablet 11    diclofenac sodium 1 % kit Apply 1 Application topically 3 times a day as needed (pain).      escitalopram (Lexapro) 20 mg tablet Take 1 tablet (20 mg) by mouth once daily. For seasonal depression 90 tablet 3    [START ON 11/27/2024] lisdexamfetamine (Vyvanse) 50 mg capsule Take 1 capsule (50 mg) by mouth once daily in the morning. Do not fill before November 27, 2024.  "30 capsule 0    loratadine-pseudoephedrine (Claritin-D 24 Hour)  mg 24 hr tablet Take 1 tablet by mouth once daily. 30 tablet 2    magnesium oxide (Mag-Ox) 400 mg tablet Take 1 tablet (400 mg) by mouth once daily.      syringe with needle (Syringe 3cc/22Gx1\") 3 mL 22 gauge x 1\" syringe Inject 1 each into the shoulder, thigh, or buttocks every 14 (fourteen) days.      syringe with needle 12 mL 18 gauge x 1\" syringe 1 each by Does not apply route every 14 (fourteen) days. Use 18G needle to draw up Testosterone, 22G needle to inject.      testosterone cypionate (Depo-Testosterone) 200 mg/mL injection Inject 0.9ml every 2 weeks 10 mL 2    traZODone (Desyrel) 50 mg tablet Take 2 tablets (100 mg) by mouth once daily at bedtime. Can take 1/2 tablet (25mg) up to 100mg (2 tablets). 120 tablet 0    [DISCONTINUED] lisdexamfetamine (Vyvanse) 50 mg capsule Take 1 capsule (50 mg) by mouth once daily in the morning. Do not fill before July 30, 2024. 30 capsule 0    [DISCONTINUED] lisdexamfetamine (Vyvanse) 50 mg capsule Take 1 capsule (50 mg) by mouth once daily in the morning. Do not fill before August 29, 2024. 30 capsule 0    [DISCONTINUED] lisdexamfetamine (Vyvanse) 50 mg capsule Take 1 capsule (50 mg) by mouth once daily in the morning. Do not fill before September 28, 2024. 30 capsule 0    [DISCONTINUED] lisdexamfetamine (Vyvanse) 50 mg capsule Take 1 capsule (50 mg) by mouth once daily in the morning. Do not fill before October 28, 2024. (Patient not taking: Reported on 11/18/2024) 30 capsule 0     No current facility-administered medications on file prior to visit.       Lab Review:   Office Visit on 10/16/2024   Component Date Value    WBC 10/16/2024 6.8     nRBC 10/16/2024 0.0     RBC 10/16/2024 5.57     Hemoglobin 10/16/2024 14.2     Hematocrit 10/16/2024 45.9     MCV 10/16/2024 82     MCH 10/16/2024 25.5 (L)     MCHC 10/16/2024 30.9 (L)     RDW 10/16/2024 13.3     Platelets 10/16/2024 364     Glucose 10/16/2024 " 75     Sodium 10/16/2024 140     Potassium 10/16/2024 4.2     Chloride 10/16/2024 104     Bicarbonate 10/16/2024 27     Anion Gap 10/16/2024 13     Urea Nitrogen 10/16/2024 12     Creatinine 10/16/2024 0.87     eGFR 10/16/2024 >90     Calcium 10/16/2024 9.3     Albumin 10/16/2024 4.7     Alkaline Phosphatase 10/16/2024 79     Total Protein 10/16/2024 7.1     AST 10/16/2024 19     Bilirubin, Total 10/16/2024 0.5     ALT 10/16/2024 21     Testosterone 10/16/2024 415    Lab on 07/17/2024   Component Date Value    WBC 07/17/2024 7.7     nRBC 07/17/2024 0.0     RBC 07/17/2024 5.51     Hemoglobin 07/17/2024 13.9     Hematocrit 07/17/2024 45.4     MCV 07/17/2024 82     MCH 07/17/2024 25.2 (L)     MCHC 07/17/2024 30.6 (L)     RDW 07/17/2024 13.5     Platelets 07/17/2024 331     Testosterone 07/17/2024 717     Methamphetamine Quant, Ur 07/17/2024 <200     MDA, Urine 07/17/2024 <200     MDEA, Urine 07/17/2024 <200     Phentermine,Urine 07/17/2024 <200     Amphetamines,Urine 07/17/2024 >5000     MDMA, Urine 07/17/2024 <200          Time Spent:    Prep time: 1 min.  Direct patient time: 32 min.  Documentation time: 5 min.  Total time: 38 min.    Next Appointment:  Follow up in 9 weeks (on 1/20/2025).

## 2024-11-20 ASSESSMENT — ENCOUNTER SYMPTOMS: NERVOUS/ANXIOUS: 1

## 2025-01-20 ENCOUNTER — APPOINTMENT (OUTPATIENT)
Dept: BEHAVIORAL HEALTH | Facility: CLINIC | Age: 30
End: 2025-01-20
Payer: COMMERCIAL

## 2025-01-20 DIAGNOSIS — F90.0 ATTENTION DEFICIT HYPERACTIVITY DISORDER (ADHD), PREDOMINANTLY INATTENTIVE TYPE: ICD-10-CM

## 2025-01-20 DIAGNOSIS — F41.1 GAD (GENERALIZED ANXIETY DISORDER): Primary | ICD-10-CM

## 2025-01-20 DIAGNOSIS — F33.40 RECURRENT MAJOR DEPRESSIVE DISORDER, IN REMISSION (CMS-HCC): ICD-10-CM

## 2025-01-20 DIAGNOSIS — G47.9 SLEEP DIFFICULTIES: ICD-10-CM

## 2025-01-20 DIAGNOSIS — F64.9 GENDER DYSPHORIA: ICD-10-CM

## 2025-01-20 PROCEDURE — 99214 OFFICE O/P EST MOD 30 MIN: CPT | Performed by: NURSE PRACTITIONER

## 2025-01-20 RX ORDER — TRAZODONE HYDROCHLORIDE 50 MG/1
100 TABLET ORAL NIGHTLY
Qty: 180 TABLET | Refills: 3 | Status: SHIPPED | OUTPATIENT
Start: 2025-01-20 | End: 2026-01-20

## 2025-01-20 ASSESSMENT — ANXIETY QUESTIONNAIRES
1. FEELING NERVOUS, ANXIOUS, OR ON EDGE: SEVERAL DAYS
3. WORRYING TOO MUCH ABOUT DIFFERENT THINGS: NOT AT ALL
GAD7 TOTAL SCORE: 5
5. BEING SO RESTLESS THAT IT IS HARD TO SIT STILL: MORE THAN HALF THE DAYS
6. BECOMING EASILY ANNOYED OR IRRITABLE: NOT AT ALL
2. NOT BEING ABLE TO STOP OR CONTROL WORRYING: SEVERAL DAYS
IF YOU CHECKED OFF ANY PROBLEMS ON THIS QUESTIONNAIRE, HOW DIFFICULT HAVE THESE PROBLEMS MADE IT FOR YOU TO DO YOUR WORK, TAKE CARE OF THINGS AT HOME, OR GET ALONG WITH OTHER PEOPLE: NOT DIFFICULT AT ALL
4. TROUBLE RELAXING: NOT AT ALL
7. FEELING AFRAID AS IF SOMETHING AWFUL MIGHT HAPPEN: SEVERAL DAYS

## 2025-01-20 ASSESSMENT — PATIENT HEALTH QUESTIONNAIRE - PHQ9
8. MOVING OR SPEAKING SO SLOWLY THAT OTHER PEOPLE COULD HAVE NOTICED. OR THE OPPOSITE, BEING SO FIGETY OR RESTLESS THAT YOU HAVE BEEN MOVING AROUND A LOT MORE THAN USUAL: NOT AT ALL
1. LITTLE INTEREST OR PLEASURE IN DOING THINGS: NOT AT ALL
6. FEELING BAD ABOUT YOURSELF - OR THAT YOU ARE A FAILURE OR HAVE LET YOURSELF OR YOUR FAMILY DOWN: NOT AT ALL
3. TROUBLE FALLING OR STAYING ASLEEP OR SLEEPING TOO MUCH: SEVERAL DAYS
2. FEELING DOWN, DEPRESSED OR HOPELESS: NOT AT ALL
7. TROUBLE CONCENTRATING ON THINGS, SUCH AS READING THE NEWSPAPER OR WATCHING TELEVISION: NOT AT ALL
5. POOR APPETITE OR OVEREATING: NOT AT ALL
4. FEELING TIRED OR HAVING LITTLE ENERGY: NOT AT ALL
9. THOUGHTS THAT YOU WOULD BE BETTER OFF DEAD, OR OF HURTING YOURSELF: NOT AT ALL
10. IF YOU CHECKED OFF ANY PROBLEMS, HOW DIFFICULT HAVE THESE PROBLEMS MADE IT FOR YOU TO DO YOUR WORK, TAKE CARE OF THINGS AT HOME, OR GET ALONG WITH OTHER PEOPLE: NOT DIFFICULT AT ALL

## 2025-01-20 ASSESSMENT — ENCOUNTER SYMPTOMS: NERVOUS/ANXIOUS: 1

## 2025-01-20 NOTE — PROGRESS NOTES
"Adult Ambulatory Psychiatry Progress Note      Assessment/Plan     Impression:  Sheng Turner is a 29 y.o. transgender male domiciled , employed as  who presents for follow up with CC of \"I am alright. I got let-go from my job at the beginning of December, and didn't get a real explanation other then I need a job where I need to have the space to move on up the ladder. However I am ok with that, where I was in a state of shock and everyone there was confused and when I called Ashley, she was shocked. It turns out I was like $10-15k underpaid, so that is where I am at. I am now looking for work and I have had 4-6 job interviews. Overall I am feeling pretty good now especially with time has moved on and talking with recruiters and getting the sense that something has not been alright.\"    Plan: Patient was cooperative and engaging but slightly anxious, revealing he had lost his job in December and now is looking for a new job. Reviewed and agreed to leaving medications and treatment plan in place, as he was overall stable and knowing what was triggering his anxiety. Patient does need his licensure accommodations form to be filled out as he is needing to take the exam soon - and will supply link.    Medication: Wellbutrin XL 150mg every day. Lexapro 20mg every day, Vyvanse 50mg every day. Trazodone 37.5mg at bedtime as needed for sleep aid.       Reviewed r/b/a, possible side effects of the medication. Client is aware about the benefit outweighs the risk.     Diagnoses and all orders for this visit:  АЛЕКСАНДР (generalized anxiety disorder)  Sleep difficulties  -     traZODone (Desyrel) 50 mg tablet; Take 2 tablets (100 mg) by mouth once daily at bedtime. Can take 1/2 tablet (25mg) up to 100mg (2 tablets).  Attention deficit hyperactivity disorder (ADHD), predominantly inattentive type  Gender dysphoria  Recurrent major depressive disorder, in remission (CMS-HCC)            Therapy: " none    Other: n/a    Patient is reminded that if there is SI to call [phone] and get themselves to the closest ED for evaluation, otherwise contact me for other questions/concerns.     Subjective   HPI:    Virtual Consent    An interactive audio and video telecommunication system which permits real time communications between the patient (at home) and provider (at home office) was utilized to provide this telehealth service.   Verbal consent was requested and obtained from Sheng Turner on this date, 01/20/2025 for a telehealth visit.     Present Illness - anxiety     Characteristics/Recent psychiatric symptoms (pertinent positives and negatives) - Anxiety has been 'present but not overwhelming' and ties it to the job search, and the current political issues unfolding in the country and how it is impacting the rest of the world. Reports he and his wife are doing well, even as he is looking out for work currently and trying to plan for next steps for themselves (financially, as they own a home, he needs to buy a new car) and his father in law is stable and will be going up to [place] to stay with him for the next 2 weeks with all of their pets to take care of him, as he is going to need surgery in 1 week, and then Ashley, his wife, will fly up the week later and they both will then drive back home with their pets. Reports issues with his own family 'are not too bad. I did tell my dad, this is of his own doing, with what has happened with my mother. She's actually doing well. My brother is starting up his 4th semester of college right now.' Denies concerns related to depression, even after he lost his job just over a month ago, as he was expecting 'that to hit me hard, but when everyone (recruiters) from different companies were reaching out to me, that felt good and that was shortly there after I lost my job.' Denies seasonal depression symptoms. Reports ADHD remains stable on Vyvanse. Reports energy  levels and mental/physical fatigue are still stable. Reports sleep is 'too good' and as he is not currently at work, and not having a specific routine, he will stay up late and sleeps 7-8 hours every day.  Appetite has been 'normal. I've been cooking more since I am off from work'. Reports no issues with experiencing any racing, obsessive, or even ruminating thoughts 'since I don't have to contend with the bullshit at work, that stress was getting to me. I am doing fine now.'     Onset/timeframe - 4 weeks  Type - anxiety  Duration - situational  Aggravating and/or relieving factors/triggers - trying to find a new job after being let go from job in December, and while initially shocked, anxious and down, has since recovered but anxiety with trying to find work is motivating him to get a job fast.  Treatment and treatment changes (new meds, dosage increases or decreases, med compliance, therapy frequency, etc.) (Past and Recent) - Wellbutrin XL 150mg QD, Lexapro 20mg QD, Vyvanse 50mg QD, Trazodone 37.5mg @[facility]. Reports seeing Frances for therapy once every other Monday.     Issues: Denies SI/HI/AVH.          Review of Systems   Psychiatric/Behavioral:  The patient is nervous/anxious.        OARRS:  Yuval Chris, JEAN-PIERRE-CNP on 1/20/2025  4:33 PM  I have personally reviewed the OARRS report for Sheng Turner. I have considered the risks of abuse, dependence, addiction and diversion    Is the patient prescribed a combination of a benzodiazepine and opioid?  No    Last Urine Drug Screen / ordered today: No  Recent Results (from the past 8760 hours)   Amphetamine Confirm, Urine    Collection Time: 07/17/24  1:59 PM   Result Value Ref Range    Methamphetamine Quant, Ur <200 ng/mL    MDA, Urine <200 ng/mL    MDEA, Urine <200 ng/mL    Phentermine,Urine <200 ng/mL    Amphetamines,Urine >5000 ng/mL    MDMA, Urine <200 ng/mL     Results are as expected.         Controlled Substance Agreement:  Date of the Last  Agreement: 10/27/2023, signed 03/22/2024  Reviewed Controlled Substance Agreement including but not limited to the benefits, risks, and alternatives to treatment with a Controlled Substance medication(s).    Stimulants:   What is the patient's goal of therapy? Stable attention/focus  Is this being achieved with current treatment? yes    Activities of Daily Living:   Is your overall impression that this patient is benefiting (symptom reduction outweighs side effects) from stimulant therapy? Yes     1. Physical Functioning: Better  2. Family Relationship: Same  3. Social Relationship: Same  4. Mood: Better  5. Sleep Patterns: Better  6. Overall Function: Better      Objective   Mental Status Exam:  General Appearance: Well groomed, appropriate eye contact  Attitude/Behavior: Cooperative, Distracted  Motor: No psychomotor agitation or retardation, no tremor or other abnormal movements  Speech: Normal rate, volume, prosody  Gait/Station: Other:(comment) (sitting in front of home computer, from home over virtual connection)  Mood: 'not bad'  Affect: Euthymic, full-range  Thought Process: Linear, goal directed  Thought Associations: No loosening of associations  Thought Content: Normal  Perception: No perceptual abnormalities noted  Sensorium: Alert and oriented to person, place, time and situation  Insight: Intact  Judgement: Intact  Cognition: Cognitively intact to conversational testing with respect to attention, orientation, fund of knowledge, recent and remote memory, and language  Testing: N/A    АЛЕКСАНДР-7/PHQ-9 scores reviewed: 5, 1 compared to 3, 2 reflecting mild increase in anxiety but stable  depression.    Current Medications:  Current Outpatient Medications on File Prior to Visit   Medication Sig Dispense Refill    alcohol swabs pads, medicated USE AS DIRECTED.      buPROPion XL (Wellbutrin XL) 150 mg 24 hr tablet Take 1 tablet (150 mg) by mouth once daily. Do not crush, chew, or split. Do not fill before  "September 3, 2024. 90 tablet 3    cetirizine (ZyrTEC) 10 mg tablet Take 1 tablet (10 mg) by mouth once daily as needed for allergies. 30 tablet 11    diclofenac sodium 1 % kit Apply 1 Application topically 3 times a day as needed (pain).      escitalopram (Lexapro) 20 mg tablet Take 1 tablet (20 mg) by mouth once daily. For seasonal depression 90 tablet 3    lisdexamfetamine (Vyvanse) 50 mg capsule Take 1 capsule (50 mg) by mouth once daily in the morning. Do not fill before December 27, 2024. 30 capsule 0    lisdexamfetamine (Vyvanse) 50 mg capsule Take 1 capsule (50 mg) by mouth once daily in the morning. Do not fill before January 26, 2025. 30 capsule 0    [START ON 2/25/2025] lisdexamfetamine (Vyvanse) 50 mg capsule Take 1 capsule (50 mg) by mouth once daily in the morning. Do not fill before February 25, 2025. 30 capsule 0    loratadine-pseudoephedrine (Claritin-D 24 Hour)  mg 24 hr tablet Take 1 tablet by mouth once daily. 30 tablet 2    magnesium oxide (Mag-Ox) 400 mg tablet Take 1 tablet (400 mg) by mouth once daily.      syringe with needle (Syringe 3cc/22Gx1\") 3 mL 22 gauge x 1\" syringe Inject 1 each into the shoulder, thigh, or buttocks every 14 (fourteen) days.      syringe with needle 12 mL 18 gauge x 1\" syringe 1 each by Does not apply route every 14 (fourteen) days. Use 18G needle to draw up Testosterone, 22G needle to inject.      testosterone cypionate (Depo-Testosterone) 200 mg/mL injection Inject 0.9ml every 2 weeks 10 mL 2    [DISCONTINUED] traZODone (Desyrel) 50 mg tablet Take 2 tablets (100 mg) by mouth once daily at bedtime. Can take 1/2 tablet (25mg) up to 100mg (2 tablets). 120 tablet 0    [DISCONTINUED] lisdexamfetamine (Vyvanse) 50 mg capsule Take 1 capsule (50 mg) by mouth once daily in the morning. Do not fill before November 27, 2024. 30 capsule 0     No current facility-administered medications on file prior to visit.       Lab Review:   Office Visit on 10/16/2024   Component " Date Value    WBC 10/16/2024 6.8     nRBC 10/16/2024 0.0     RBC 10/16/2024 5.57     Hemoglobin 10/16/2024 14.2     Hematocrit 10/16/2024 45.9     MCV 10/16/2024 82     MCH 10/16/2024 25.5 (L)     MCHC 10/16/2024 30.9 (L)     RDW 10/16/2024 13.3     Platelets 10/16/2024 364     Glucose 10/16/2024 75     Sodium 10/16/2024 140     Potassium 10/16/2024 4.2     Chloride 10/16/2024 104     Bicarbonate 10/16/2024 27     Anion Gap 10/16/2024 13     Urea Nitrogen 10/16/2024 12     Creatinine 10/16/2024 0.87     eGFR 10/16/2024 >90     Calcium 10/16/2024 9.3     Albumin 10/16/2024 4.7     Alkaline Phosphatase 10/16/2024 79     Total Protein 10/16/2024 7.1     AST 10/16/2024 19     Bilirubin, Total 10/16/2024 0.5     ALT 10/16/2024 21     Testosterone 10/16/2024 415          Time Spent:    Prep time: 1 min.  Direct patient time: 29 min.  Documentation time: 5 min.  Total time: 35 min.    Next Appointment:  Follow up in 9 weeks (on 3/24/2025).

## 2025-01-26 PROBLEM — G47.9 SLEEP DIFFICULTIES: Status: ACTIVE | Noted: 2025-01-26

## 2025-02-18 ENCOUNTER — TELEPHONE (OUTPATIENT)
Dept: BEHAVIORAL HEALTH | Facility: CLINIC | Age: 30
End: 2025-02-18
Payer: COMMERCIAL

## 2025-02-20 ENCOUNTER — TELEPHONE (OUTPATIENT)
Dept: BEHAVIORAL HEALTH | Facility: CLINIC | Age: 30
End: 2025-02-20
Payer: COMMERCIAL

## 2025-02-20 DIAGNOSIS — F90.0 ATTENTION DEFICIT HYPERACTIVITY DISORDER (ADHD), PREDOMINANTLY INATTENTIVE TYPE: Primary | ICD-10-CM

## 2025-02-20 RX ORDER — LISDEXAMFETAMINE DIMESYLATE 50 MG/1
50 CAPSULE ORAL EVERY MORNING
Qty: 30 CAPSULE | Refills: 0 | Status: SHIPPED | OUTPATIENT
Start: 2025-02-20 | End: 2025-03-22

## 2025-02-20 NOTE — PROGRESS NOTES
Reviewed OARRS on 02/20/2025 by JEAN-PIERRE Bloom-CNP -OARRS has been reviewed and is consistent with prescribed medications, Considered the risks of abuse, dependence, addiction and diversion, Medication is felt to be clinically appropriate based on documented diagnosis     Sending to an alternate pharmacy as requested.

## 2025-03-24 ENCOUNTER — APPOINTMENT (OUTPATIENT)
Dept: BEHAVIORAL HEALTH | Facility: CLINIC | Age: 30
End: 2025-03-24
Payer: COMMERCIAL

## 2025-03-24 DIAGNOSIS — F90.0 ATTENTION DEFICIT HYPERACTIVITY DISORDER (ADHD), PREDOMINANTLY INATTENTIVE TYPE: ICD-10-CM

## 2025-03-24 DIAGNOSIS — F41.9 ANXIETY: ICD-10-CM

## 2025-03-24 DIAGNOSIS — F64.9 GENDER DYSPHORIA: ICD-10-CM

## 2025-03-24 DIAGNOSIS — G47.9 SLEEP DIFFICULTIES: ICD-10-CM

## 2025-03-24 DIAGNOSIS — F41.1 GAD (GENERALIZED ANXIETY DISORDER): Primary | ICD-10-CM

## 2025-03-24 DIAGNOSIS — F33.40 RECURRENT MAJOR DEPRESSIVE DISORDER, IN REMISSION (CMS-HCC): ICD-10-CM

## 2025-03-24 DIAGNOSIS — F33.1 MODERATE EPISODE OF RECURRENT MAJOR DEPRESSIVE DISORDER: ICD-10-CM

## 2025-03-24 DIAGNOSIS — F33.8 SEASONAL AFFECTIVE DISORDER: ICD-10-CM

## 2025-03-24 PROCEDURE — 1036F TOBACCO NON-USER: CPT | Performed by: NURSE PRACTITIONER

## 2025-03-24 PROCEDURE — 99214 OFFICE O/P EST MOD 30 MIN: CPT | Performed by: NURSE PRACTITIONER

## 2025-03-24 RX ORDER — ESCITALOPRAM OXALATE 20 MG/1
20 TABLET ORAL DAILY
Qty: 90 TABLET | Refills: 3 | Status: SHIPPED | OUTPATIENT
Start: 2025-03-24 | End: 2026-03-24

## 2025-03-24 RX ORDER — LISDEXAMFETAMINE DIMESYLATE 50 MG/1
50 CAPSULE ORAL EVERY MORNING
Qty: 30 CAPSULE | Refills: 0 | Status: SHIPPED | OUTPATIENT
Start: 2025-04-26 | End: 2025-05-26

## 2025-03-24 RX ORDER — LISDEXAMFETAMINE DIMESYLATE 50 MG/1
50 CAPSULE ORAL EVERY MORNING
Qty: 30 CAPSULE | Refills: 0 | Status: SHIPPED | OUTPATIENT
Start: 2025-05-26 | End: 2025-06-25

## 2025-03-24 RX ORDER — LISDEXAMFETAMINE DIMESYLATE 50 MG/1
50 CAPSULE ORAL EVERY MORNING
Qty: 30 CAPSULE | Refills: 0 | Status: SHIPPED | OUTPATIENT
Start: 2025-03-27 | End: 2025-04-26

## 2025-03-24 ASSESSMENT — ANXIETY QUESTIONNAIRES
1. FEELING NERVOUS, ANXIOUS, OR ON EDGE: SEVERAL DAYS
5. BEING SO RESTLESS THAT IT IS HARD TO SIT STILL: NOT AT ALL
3. WORRYING TOO MUCH ABOUT DIFFERENT THINGS: SEVERAL DAYS
4. TROUBLE RELAXING: NOT AT ALL
IF YOU CHECKED OFF ANY PROBLEMS ON THIS QUESTIONNAIRE, HOW DIFFICULT HAVE THESE PROBLEMS MADE IT FOR YOU TO DO YOUR WORK, TAKE CARE OF THINGS AT HOME, OR GET ALONG WITH OTHER PEOPLE: NOT DIFFICULT AT ALL
6. BECOMING EASILY ANNOYED OR IRRITABLE: NOT AT ALL
2. NOT BEING ABLE TO STOP OR CONTROL WORRYING: NOT AT ALL
GAD7 TOTAL SCORE: 3
7. FEELING AFRAID AS IF SOMETHING AWFUL MIGHT HAPPEN: SEVERAL DAYS

## 2025-03-24 ASSESSMENT — PATIENT HEALTH QUESTIONNAIRE - PHQ9
4. FEELING TIRED OR HAVING LITTLE ENERGY: SEVERAL DAYS
10. IF YOU CHECKED OFF ANY PROBLEMS, HOW DIFFICULT HAVE THESE PROBLEMS MADE IT FOR YOU TO DO YOUR WORK, TAKE CARE OF THINGS AT HOME, OR GET ALONG WITH OTHER PEOPLE: NOT DIFFICULT AT ALL
1. LITTLE INTEREST OR PLEASURE IN DOING THINGS: NOT AT ALL
7. TROUBLE CONCENTRATING ON THINGS, SUCH AS READING THE NEWSPAPER OR WATCHING TELEVISION: NOT AT ALL
6. FEELING BAD ABOUT YOURSELF - OR THAT YOU ARE A FAILURE OR HAVE LET YOURSELF OR YOUR FAMILY DOWN: NOT AT ALL
9. THOUGHTS THAT YOU WOULD BE BETTER OFF DEAD, OR OF HURTING YOURSELF: NOT AT ALL
5. POOR APPETITE OR OVEREATING: NOT AT ALL
2. FEELING DOWN, DEPRESSED OR HOPELESS: NOT AT ALL
3. TROUBLE FALLING OR STAYING ASLEEP OR SLEEPING TOO MUCH: NOT AT ALL
8. MOVING OR SPEAKING SO SLOWLY THAT OTHER PEOPLE COULD HAVE NOTICED. OR THE OPPOSITE, BEING SO FIGETY OR RESTLESS THAT YOU HAVE BEEN MOVING AROUND A LOT MORE THAN USUAL: NOT AT ALL

## 2025-03-24 ASSESSMENT — ENCOUNTER SYMPTOMS
DYSPHORIC MOOD: 1
NERVOUS/ANXIOUS: 1

## 2025-03-24 NOTE — PROGRESS NOTES
"Adult Ambulatory Psychiatry Progress Note      Assessment/Plan     Impression:  Sheng Turner is a 29 y.o. transgender male domiciled , employed as  who presents for follow up with CC of \"I am good. I am in my 4th week at my new job. I am doing the same thing like I was in my old job, and instead of quick projects, I am working on longer term projects, and I get paid for time and travel and have interacted with my state and actual clients over the past 3 weeks, then I did over the past 2 years with the previous job. Also there is an abundance of Pride Flags everywhere which is really nice and feels good and refreshing.\"    Plan: Patient was cooperative and engaging but slightly anxious. Indicated issues with sweating and agreed to having him stop taking Wellbutrin and message in 2 weeks any improvement. Will monitor return in depressive symptoms. Reviewed and agreed to leaving other medications and treatment plan in place and have patient return in person to sign CSA for Vyvanse and have urine test done then. Continues to see therapist.    Medication: Stops taking Wellbutrin. Lexapro 20mg every day, Vyvanse 50mg every day. Trazodone 37.5mg at bedtime as needed for sleep aid.       Reviewed r/b/a, possible side effects of the medication. Client is aware about the benefit outweighs the risk.     Diagnoses and all orders for this visit:  АЛЕКСАНДР (generalized anxiety disorder)  Anxiety  -     escitalopram (Lexapro) 20 mg tablet; Take 1 tablet (20 mg) by mouth once daily. For seasonal depression  Seasonal affective disorder (CMS-HCC)  -     escitalopram (Lexapro) 20 mg tablet; Take 1 tablet (20 mg) by mouth once daily. For seasonal depression  Recurrent major depressive disorder, in remission (CMS-HCC)  -     escitalopram (Lexapro) 20 mg tablet; Take 1 tablet (20 mg) by mouth once daily. For seasonal depression  Attention deficit hyperactivity disorder (ADHD), predominantly inattentive " type  -     lisdexamfetamine (Vyvanse) 50 mg capsule; Take 1 capsule (50 mg) by mouth once daily in the morning. Do not fill before 2025.  -     lisdexamfetamine (Vyvanse) 50 mg capsule; Take 1 capsule (50 mg) by mouth once daily in the morning. Do not fill before 2025.  -     lisdexamfetamine (Vyvanse) 50 mg capsule; Take 1 capsule (50 mg) by mouth once daily in the morning. Do not fill before May 26, 2025.  Gender dysphoria  Moderate episode of recurrent major depressive disorder  Sleep difficulties              Therapy: none    Other: n/a    Patient is reminded that if there is SI to call [phone] and get themselves to the closest ED for evaluation, otherwise contact me for other questions/concerns.     Subjective   HPI:    Virtual Consent    An interactive audio and video telecommunication system which permits real time communications between the patient (at home) and provider (at home office) was utilized to provide this telehealth service.   Verbal consent was requested and obtained from Sheng Turner on this date, 2025 for a telehealth visit.     Present Illness - anxiety, depression     Characteristics/Recent psychiatric symptoms (pertinent positives and negatives) - outside of the political stress/chaos, the personal 'bubble' of chaos, close to his sleeve is manageable. Got a new job starting 3 weeks ago and likes the new company, where it is very liberal - one office is located in [place], [place] and the other is in [place], [place] and finds it to be LGBT supportive and feels good. Reports his wife's uncle  and will be going to [place] in 2 weekends for his , but also his wife may quit her job, as she still finds it to be toxic. Reports his grandmother was in the hospital for a week recently for kidney stones. As they continue to plan their own wedding, they are not inviting his one aunt whom is a 'bitch' as she is dead naming and wrong gendering his name on purpose. Admits  "he has been having this ongoing conversation with his own dad, but also because of his grandmother's ailing health, she may not be able to attend their wedding. Reveals his own mother's dementia is continuing to progressively get worse, and his father is on the verge of transporting her to an assisted memory living but the cost is exorbitant. 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 Reports he is constantly sweating every night, to the point of drenching his sheets and his partner has to put distance between the two of them. Acknowledged it is either the Wellbutrin or Vyvanse. Overall anxiety and depression is stable, as he keeps his knowledge and awareness of the political issues in check as he doesn't want to allow himself to get overwhelmed and his thoughts start to spiral out of control. Reports making sure to stay away from FB and TikTok is 'fun shit only and nothing else.' Reports otherwise he and his partner binge watch Drag Race - fun, queer based viewing to distract them, but also any other binge worthy tv. \"I know if I see something that I may end up where I will start arguing with the people whom you can't argue with.\" Reports making effort to focus on the positives in his life - friends, friends with babies, wedding/planning for his own wedding in August this year. Sleep remains 'good', outside of his cat wanting to wake him early to get fed every morning and averages 7 hrs a night. Energy levels and mental/physical fatigue are 'both good', and with being mentally stimulated with being back to work all day, he is now able to fall asleep without issue. Appetite has been 'good, if not normal. I've been doing a lot more meal prepping and planning.' Reports ADHD remains stable on Vyvanse at the current dose. Denies concerns with racing, " obsessive, ruminating or intrusive thoughts.     Onset/timeframe - 4 weeks  Type - anxiety, depression, sweating  Duration - daily  Aggravating and/or relieving factors/triggers - new job and adjusting to working again, death in family, health issues with mom and grandmother, politics; s/e sweating - Wellbutrin?  Treatment and treatment changes (new meds, dosage increases or decreases, med compliance, therapy frequency, etc.) (Past and Recent) - Wellbutrin XL 150mg QD, Lexapro 20mg QD, Vyvanse 50mg QD, Trazodone 37.5mg @[facility]. Reports seeing Frances for therapy once every other Monday.     Issues: Denies SI/HI/AVH.          Review of Systems   Psychiatric/Behavioral:  Positive for dysphoric mood. The patient is nervous/anxious.        OARRS:  Yuval Chris, JEAN-PIERRE-CNP on 3/24/2025  4:33 PM  I have personally reviewed the OARRS report for Sheng Turner. I have considered the risks of abuse, dependence, addiction and diversion    Is the patient prescribed a combination of a benzodiazepine and opioid?  No    Last Urine Drug Screen / ordered today: No  Recent Results (from the past 8760 hours)   Amphetamine Confirm, Urine    Collection Time: 07/17/24  1:59 PM   Result Value Ref Range    Methamphetamine Quant, Ur <200 ng/mL    MDA, Urine <200 ng/mL    MDEA, Urine <200 ng/mL    Phentermine,Urine <200 ng/mL    Amphetamines,Urine >5000 ng/mL    MDMA, Urine <200 ng/mL     Results are as expected.         Controlled Substance Agreement:  Date of the Last Agreement: 03/24/2025  Reviewed Controlled Substance Agreement including but not limited to the benefits, risks, and alternatives to treatment with a Controlled Substance medication(s).    Stimulants:   What is the patient's goal of therapy? Stable attention/focus  Is this being achieved with current treatment? yes    Activities of Daily Living:   Is your overall impression that this patient is benefiting (symptom reduction outweighs side effects) from stimulant  therapy? Yes     1. Physical Functioning: Better  2. Family Relationship: Same  3. Social Relationship: Same  4. Mood: Better  5. Sleep Patterns: Better  6. Overall Function: Better      Objective   Mental Status Exam:  General Appearance: Well groomed, appropriate eye contact  Attitude/Behavior: Cooperative, Distracted  Motor: Fidgeting  Speech: Rapid Speech, pressured, Other: (comment) (perseverative)  Gait/Station: Other:(comment) (sitting in front of work station at work, over virtual connection)  Mood: 'good, just edgy some'  Affect: Euthymic, full-range, Anxious, Congruent with mood and topic of conversation  Thought Process: Linear, goal directed  Thought Associations: No loosening of associations  Thought Content: Normal, Other: (comment) (glad he has a new job, but has multiple stressors in his life currently to deal with - maynor's uncle recently  and will be traveling to [place] for the  in 2 weeks, dealing with grandmother's and mother's health issues, politics still worrying)  Perception: No perceptual abnormalities noted  Sensorium: Alert and oriented to person, place, time and situation  Insight: Fair  Judgement: Intact  Cognition: Cognitively intact to conversational testing with respect to attention, orientation, fund of knowledge, recent and remote memory, and language  Testing: N/A    АЛЕКСАНДР-7/PHQ-9 scores reviewed: 3, 1 compared to 5, 1 reflecting mild increase in anxiety but stable depression.    Current Medications:  Current Outpatient Medications on File Prior to Visit   Medication Sig Dispense Refill    alcohol swabs pads, medicated USE AS DIRECTED.      cetirizine (ZyrTEC) 10 mg tablet Take 1 tablet (10 mg) by mouth once daily as needed for allergies. 30 tablet 11    diclofenac sodium 1 % kit Apply 1 Application topically 3 times a day as needed (pain).      lisdexamfetamine (Vyvanse) 50 mg capsule Take 1 capsule (50 mg) by mouth once daily in the morning. Do not fill before   "2025. 30 capsule 0    loratadine-pseudoephedrine (Claritin-D 24 Hour)  mg 24 hr tablet Take 1 tablet by mouth once daily. 30 tablet 2    magnesium oxide (Mag-Ox) 400 mg tablet Take 1 tablet (400 mg) by mouth once daily.      syringe with needle (Syringe 3cc/22Gx1\") 3 mL 22 gauge x 1\" syringe Inject 1 each into the shoulder, thigh, or buttocks every 14 (fourteen) days.      syringe with needle 12 mL 18 gauge x 1\" syringe 1 each by Does not apply route every 14 (fourteen) days. Use 18G needle to draw up Testosterone, 22G needle to inject.      testosterone cypionate (Depo-Testosterone) 200 mg/mL injection Inject 0.9ml every 2 weeks 10 mL 2    traZODone (Desyrel) 50 mg tablet Take 2 tablets (100 mg) by mouth once daily at bedtime. Can take 1/2 tablet (25mg) up to 100mg (2 tablets). 180 tablet 3    [DISCONTINUED] buPROPion XL (Wellbutrin XL) 150 mg 24 hr tablet Take 1 tablet (150 mg) by mouth once daily. Do not crush, chew, or split. Do not fill before September 3, 2024. 90 tablet 3    [DISCONTINUED] escitalopram (Lexapro) 20 mg tablet Take 1 tablet (20 mg) by mouth once daily. For seasonal depression 90 tablet 3    [DISCONTINUED] lisdexamfetamine (Vyvanse) 50 mg capsule Take 1 capsule (50 mg) by mouth once daily in the morning. 30 capsule 0     No current facility-administered medications on file prior to visit.       Lab Review:   Office Visit on 10/16/2024   Component Date Value    WBC 10/16/2024 6.8     nRBC 10/16/2024 0.0     RBC 10/16/2024 5.57     Hemoglobin 10/16/2024 14.2     Hematocrit 10/16/2024 45.9     MCV 10/16/2024 82     MCH 10/16/2024 25.5 (L)     MCHC 10/16/2024 30.9 (L)     RDW 10/16/2024 13.3     Platelets 10/16/2024 364     Glucose 10/16/2024 75     Sodium 10/16/2024 140     Potassium 10/16/2024 4.2     Chloride 10/16/2024 104     Bicarbonate 10/16/2024 27     Anion Gap 10/16/2024 13     Urea Nitrogen 10/16/2024 12     Creatinine 10/16/2024 0.87     eGFR 10/16/2024 >90     Calcium 10/16/2024 " 9.3     Albumin 10/16/2024 4.7     Alkaline Phosphatase 10/16/2024 79     Total Protein 10/16/2024 7.1     AST 10/16/2024 19     Bilirubin, Total 10/16/2024 0.5     ALT 10/16/2024 21     Testosterone 10/16/2024 415          Time Spent:    Prep time: 1 min.  Direct patient time: 30 min.  Documentation time: 6 min.  Total time: 37 min.    Next Appointment:  Follow up in 4 months (on 7/22/2025).

## 2025-04-16 ENCOUNTER — OFFICE VISIT (OUTPATIENT)
Dept: PRIMARY CARE | Facility: CLINIC | Age: 30
End: 2025-04-16
Payer: COMMERCIAL

## 2025-04-16 VITALS
HEIGHT: 65 IN | SYSTOLIC BLOOD PRESSURE: 127 MMHG | TEMPERATURE: 98 F | BODY MASS INDEX: 35.99 KG/M2 | RESPIRATION RATE: 16 BRPM | WEIGHT: 216 LBS | DIASTOLIC BLOOD PRESSURE: 81 MMHG | OXYGEN SATURATION: 100 % | HEART RATE: 100 BPM

## 2025-04-16 DIAGNOSIS — E29.1 HYPOGONADISM IN MALE: ICD-10-CM

## 2025-04-16 DIAGNOSIS — F41.9 ANXIETY: Primary | ICD-10-CM

## 2025-04-16 DIAGNOSIS — G47.9 SLEEP DIFFICULTIES: ICD-10-CM

## 2025-04-16 DIAGNOSIS — F64.9 GENDER DYSPHORIA: ICD-10-CM

## 2025-04-16 PROCEDURE — 36415 COLL VENOUS BLD VENIPUNCTURE: CPT | Performed by: FAMILY MEDICINE

## 2025-04-16 PROCEDURE — 1036F TOBACCO NON-USER: CPT | Performed by: FAMILY MEDICINE

## 2025-04-16 PROCEDURE — 3008F BODY MASS INDEX DOCD: CPT | Performed by: FAMILY MEDICINE

## 2025-04-16 PROCEDURE — 99214 OFFICE O/P EST MOD 30 MIN: CPT | Performed by: FAMILY MEDICINE

## 2025-04-16 ASSESSMENT — ENCOUNTER SYMPTOMS
ABDOMINAL PAIN: 0
LOSS OF SENSATION IN FEET: 0
FEVER: 0
VOMITING: 0
WEAKNESS: 0
NUMBNESS: 0
SHORTNESS OF BREATH: 0
NAUSEA: 0
CHILLS: 0
OCCASIONAL FEELINGS OF UNSTEADINESS: 0
DEPRESSION: 0
HEADACHES: 1
COUGH: 0

## 2025-04-16 ASSESSMENT — PATIENT HEALTH QUESTIONNAIRE - PHQ9
SUM OF ALL RESPONSES TO PHQ9 QUESTIONS 1 AND 2: 0
2. FEELING DOWN, DEPRESSED OR HOPELESS: NOT AT ALL
1. LITTLE INTEREST OR PLEASURE IN DOING THINGS: NOT AT ALL

## 2025-04-16 ASSESSMENT — PAIN SCALES - GENERAL: PAINLEVEL_OUTOF10: 0-NO PAIN

## 2025-04-16 NOTE — PROGRESS NOTES
"Subjective   Patient ID: Sheng Turner is a 29 y.o. who presents for Follow-up.  HPI  #Gender affirming care  - Doing well, satisfied with current testosterone regimen (0.9 mls every 2 weeks). No concerns related to this regimen.     #MDD/ADHD  - Let go from job back in December, been working with new job for about 6 weeks. Still working in engineering. Wife's uncle passed a month and a half ago as well. Handling it well.   - Sees therapist weekly & follows with Yuval Chris, psychiatry; is satisfied with care.   - Medications reviewed in chart- was started on Wellbutrin and then taken back off by Yuval Chris due to night sweats, has noticed some weight gain since  - Continuing other medications (Vyvanse 50, Lexapro 20)     #IBS  - Reports his stools have had a different smell recently, though the stool consistency is the same, \"pretty well\" formed. Smell does not correlate with the consistency. Not on any medications currently.   - Saw GI ~8 months ago for IBS, had course of xifaxan and symptoms improved, has not seen them since    #Snoring  - New concern, reporting his wife has noticed him snoring repeatedly. He has not noticed this, and has never woken up due to this. Has never \"stopped breathing\" per his wife, she is more concerned with the noise at night. No daytime fatigue.     #Health screening  - No new sexual partners in last 6 months    Review of Systems   Constitutional:  Negative for chills and fever.   HENT:          Positive for snoring   Respiratory:  Negative for cough and shortness of breath.    Cardiovascular:  Negative for chest pain.   Gastrointestinal:  Negative for abdominal pain, nausea and vomiting.        Positive for smelly stools   Neurological:  Positive for headaches (chronic, mild, associated with stress/weather). Negative for weakness and numbness.   Psychiatric/Behavioral:  Negative for suicidal ideas.         PHQ-2 of 0   All other systems reviewed and are " "negative.    Objective     /81   Pulse 100   Temp 36.7 °C (98 °F)   Resp 16   Ht 1.651 m (5' 5\")   Wt 98 kg (216 lb)   SpO2 100%   BMI 35.94 kg/m²   General: well appearing, no distress  CV: Regular rate and rhythm, no murmur  Lungs: Clear to auscultation bilaterally  Abdomen: Soft, nontender, nondistended  Extremities: No edema noted  Psych: Appropriate mood and affect     Assessment/Plan   Sheng Turner is a 29 year old transgender male patient here for 6 month follow up of gender affirming care. Problem by problem plan as follows:     #Gender affirming care - stable  :: Currently taking 0.9 mls injections every 2 weeks  - Reviewed and signed controlled substance agreement here, continue current testosterone regimen  - Testosterone, CBC and lipid panel today  - Last testosterone shot was last Sunday (10 days ago), levels will be near-trough    #MDD/ADHD - stable  :: Follows with psychiatry and therapist  :: Previously on Wellbutrin, taken off by psychiatrist due to night sweats  - On Vyvanse and Lexapro, continue current regimen    #IBS - stable  :: Reporting change in stool smell without change in consistency  - Has seen GI in the past, can follow with them in the future if he chooses    #Snoring - new complaint  :: Reports per wife, patient never stops breathing or wakes up at night, not associated with daytime fatigue  :: STOPBANG score of 3, weight puts him just into intermediate risk, offered referral for sleep study vs observation for now and patient is comfortable with montoring for now  - Discussed that if his weight continues to increase, if he wakes from sleep or becomes fatigued during the day that he should get a sleep study as he is at increased risk for polycythemia    RTC in 6 months    Wei Holly, MS-3  Family Medicine    Patient was seen and examined by myself in conjunction with medical student. I have edited note above. Estefany Graves    "

## 2025-04-20 LAB
CHOLEST SERPL-MCNC: 202 MG/DL
CHOLEST/HDLC SERPL: 5.6 (CALC)
ERYTHROCYTE [DISTWIDTH] IN BLOOD BY AUTOMATED COUNT: 13.4 % (ref 11–15)
HCT VFR BLD AUTO: 44.4 % (ref 38.5–50)
HDLC SERPL-MCNC: 36 MG/DL
HGB BLD-MCNC: 14.4 G/DL (ref 13.2–17.1)
LDLC SERPL CALC-MCNC: 135 MG/DL (CALC)
MCH RBC QN AUTO: 26.2 PG (ref 27–33)
MCHC RBC AUTO-ENTMCNC: 32.4 G/DL (ref 32–36)
MCV RBC AUTO: 80.7 FL (ref 80–100)
NONHDLC SERPL-MCNC: 166 MG/DL (CALC)
PLATELET # BLD AUTO: 366 THOUSAND/UL (ref 140–400)
PMV BLD REES-ECKER: 10.4 FL (ref 7.5–12.5)
RBC # BLD AUTO: 5.5 MILLION/UL (ref 4.2–5.8)
TESTOST SERPL-MCNC: 631 NG/DL (ref 250–1100)
TRIGL SERPL-MCNC: 176 MG/DL
WBC # BLD AUTO: 7.7 THOUSAND/UL (ref 3.8–10.8)

## 2025-06-30 DIAGNOSIS — F90.0 ATTENTION DEFICIT HYPERACTIVITY DISORDER (ADHD), PREDOMINANTLY INATTENTIVE TYPE: Primary | ICD-10-CM

## 2025-06-30 RX ORDER — LISDEXAMFETAMINE DIMESYLATE 50 MG/1
50 CAPSULE ORAL EVERY MORNING
Qty: 30 CAPSULE | Refills: 0 | Status: SHIPPED | OUTPATIENT
Start: 2025-06-30 | End: 2025-07-30

## 2025-06-30 RX ORDER — LISDEXAMFETAMINE DIMESYLATE 50 MG/1
50 CAPSULE ORAL EVERY MORNING
Qty: 30 CAPSULE | Refills: 0 | Status: SHIPPED | OUTPATIENT
Start: 2025-07-30 | End: 2025-08-29

## 2025-06-30 RX ORDER — LISDEXAMFETAMINE DIMESYLATE 50 MG/1
50 CAPSULE ORAL EVERY MORNING
Qty: 30 CAPSULE | Refills: 0 | Status: SHIPPED | OUTPATIENT
Start: 2025-08-29 | End: 2025-09-28

## 2025-06-30 NOTE — PROGRESS NOTES
Reviewed OARRS on [mm/dd/yyyy] by [Author] -OARRS has been reviewed and is consistent with prescribed medications, Considered the risks of abuse, dependence, addiction and diversion, Medication is felt to be clinically appropriate based on documented diagnosis    Pt requesting refill of Vyvanse. Indicated there wasn't a June refill, today. He never called in, at the end of May to refill, hence the month missed and the subsequent refills. Sending over the 3 refills to pharmacy on file and reminding patient of his responsibility to danita on his calendar to remind himself when he is down to the last 10 capsules of the last prescription to contact this note-writer, to submit the next 3 refills.

## 2025-07-22 ENCOUNTER — APPOINTMENT (OUTPATIENT)
Dept: BEHAVIORAL HEALTH | Facility: CLINIC | Age: 30
End: 2025-07-22
Payer: COMMERCIAL

## 2025-07-22 VITALS
WEIGHT: 221 LBS | DIASTOLIC BLOOD PRESSURE: 81 MMHG | TEMPERATURE: 98.4 F | HEART RATE: 106 BPM | BODY MASS INDEX: 36.82 KG/M2 | SYSTOLIC BLOOD PRESSURE: 132 MMHG | HEIGHT: 65 IN

## 2025-07-22 DIAGNOSIS — F90.0 ATTENTION DEFICIT HYPERACTIVITY DISORDER (ADHD), PREDOMINANTLY INATTENTIVE TYPE: ICD-10-CM

## 2025-07-22 DIAGNOSIS — F41.1 GAD (GENERALIZED ANXIETY DISORDER): Primary | ICD-10-CM

## 2025-07-22 DIAGNOSIS — F33.40 RECURRENT MAJOR DEPRESSIVE DISORDER, IN REMISSION: ICD-10-CM

## 2025-07-22 DIAGNOSIS — F64.9 GENDER DYSPHORIA: ICD-10-CM

## 2025-07-22 DIAGNOSIS — G47.9 SLEEP DIFFICULTIES: ICD-10-CM

## 2025-07-22 PROCEDURE — 1036F TOBACCO NON-USER: CPT | Performed by: NURSE PRACTITIONER

## 2025-07-22 PROCEDURE — 99214 OFFICE O/P EST MOD 30 MIN: CPT | Performed by: NURSE PRACTITIONER

## 2025-07-22 PROCEDURE — 3008F BODY MASS INDEX DOCD: CPT | Performed by: NURSE PRACTITIONER

## 2025-07-22 RX ORDER — CLINDAMYCIN PHOSPHATE 10 UG/ML
1 LOTION TOPICAL 2 TIMES DAILY
COMMUNITY
Start: 2025-06-15

## 2025-07-22 RX ORDER — DOXYCYCLINE 100 MG/1
100 CAPSULE ORAL DAILY
COMMUNITY
Start: 2025-06-14

## 2025-07-22 RX ORDER — MUPIROCIN 20 MG/G
1 OINTMENT TOPICAL AS NEEDED
COMMUNITY
Start: 2025-04-16

## 2025-07-22 ASSESSMENT — PATIENT HEALTH QUESTIONNAIRE - PHQ9
2. FEELING DOWN, DEPRESSED OR HOPELESS: NOT AT ALL
4. FEELING TIRED OR HAVING LITTLE ENERGY: NOT AT ALL
6. FEELING BAD ABOUT YOURSELF - OR THAT YOU ARE A FAILURE OR HAVE LET YOURSELF OR YOUR FAMILY DOWN: NOT AT ALL
7. TROUBLE CONCENTRATING ON THINGS, SUCH AS READING THE NEWSPAPER OR WATCHING TELEVISION: SEVERAL DAYS
9. THOUGHTS THAT YOU WOULD BE BETTER OFF DEAD, OR OF HURTING YOURSELF: NOT AT ALL
8. MOVING OR SPEAKING SO SLOWLY THAT OTHER PEOPLE COULD HAVE NOTICED. OR THE OPPOSITE, BEING SO FIGETY OR RESTLESS THAT YOU HAVE BEEN MOVING AROUND A LOT MORE THAN USUAL: NOT AT ALL
3. TROUBLE FALLING OR STAYING ASLEEP OR SLEEPING TOO MUCH: NOT AT ALL
1. LITTLE INTEREST OR PLEASURE IN DOING THINGS: NOT AT ALL
10. IF YOU CHECKED OFF ANY PROBLEMS, HOW DIFFICULT HAVE THESE PROBLEMS MADE IT FOR YOU TO DO YOUR WORK, TAKE CARE OF THINGS AT HOME, OR GET ALONG WITH OTHER PEOPLE: NOT DIFFICULT AT ALL
5. POOR APPETITE OR OVEREATING: NOT AT ALL

## 2025-07-22 ASSESSMENT — ANXIETY QUESTIONNAIRES
6. BECOMING EASILY ANNOYED OR IRRITABLE: NOT AT ALL
IF YOU CHECKED OFF ANY PROBLEMS ON THIS QUESTIONNAIRE, HOW DIFFICULT HAVE THESE PROBLEMS MADE IT FOR YOU TO DO YOUR WORK, TAKE CARE OF THINGS AT HOME, OR GET ALONG WITH OTHER PEOPLE: NOT DIFFICULT AT ALL
4. TROUBLE RELAXING: NOT AT ALL
3. WORRYING TOO MUCH ABOUT DIFFERENT THINGS: SEVERAL DAYS
GAD7 TOTAL SCORE: 2
2. NOT BEING ABLE TO STOP OR CONTROL WORRYING: NOT AT ALL
7. FEELING AFRAID AS IF SOMETHING AWFUL MIGHT HAPPEN: SEVERAL DAYS
5. BEING SO RESTLESS THAT IT IS HARD TO SIT STILL: NOT AT ALL
1. FEELING NERVOUS, ANXIOUS, OR ON EDGE: NOT AT ALL

## 2025-07-22 ASSESSMENT — PAIN SCALES - GENERAL: PAINLEVEL_OUTOF10: 0-NO PAIN

## 2025-07-22 ASSESSMENT — ENCOUNTER SYMPTOMS: NERVOUS/ANXIOUS: 1

## 2025-08-01 LAB
AMPHET UR-MCNC: 6149 NG/ML
AMPHET UR-MCNC: >4000 NG/ML
AMPHETAMINES UR QL: POSITIVE NG/ML
BARBITURATES UR QL: NEGATIVE NG/ML
BENZODIAZ UR QL: NEGATIVE NG/ML
BZE UR QL: NEGATIVE NG/ML
CREAT UR-MCNC: 230.1 MG/DL
DRUG SCREEN COMMENT UR-IMP: ABNORMAL
DRUG SCREEN COMMENT UR-IMP: ABNORMAL
FENTANYL UR QL SCN: NEGATIVE NG/ML
MDA UR-MCNC: NEGATIVE NG/ML
MDEA UR-MCNC: NEGATIVE NG/ML
MDMA UR-MCNC: NEGATIVE NG/ML
METHADONE UR QL: NEGATIVE NG/ML
METHAMPHET UR-MCNC: NEGATIVE NG/ML
METHAMPHET UR-MCNC: NEGATIVE NG/ML
OPIATES UR QL: NEGATIVE NG/ML
OXIDANTS UR QL: NEGATIVE MCG/ML
OXYCODONE UR QL: NEGATIVE NG/ML
PCP UR QL: NEGATIVE NG/ML
PH UR: 5.4 [PH] (ref 4.5–9)
PHENTERMINE UR-MCNC: NEGATIVE NG/ML
QUEST NOTES AND COMMENTS: ABNORMAL
THC UR QL: POSITIVE NG/ML
THC UR-MCNC: 213 NG/ML

## 2025-10-20 ENCOUNTER — APPOINTMENT (OUTPATIENT)
Dept: BEHAVIORAL HEALTH | Facility: CLINIC | Age: 30
End: 2025-10-20
Payer: COMMERCIAL